# Patient Record
Sex: MALE | Race: BLACK OR AFRICAN AMERICAN | NOT HISPANIC OR LATINO | Employment: FULL TIME | URBAN - METROPOLITAN AREA
[De-identification: names, ages, dates, MRNs, and addresses within clinical notes are randomized per-mention and may not be internally consistent; named-entity substitution may affect disease eponyms.]

---

## 2017-01-18 ENCOUNTER — OFFICE VISIT (OUTPATIENT)
Dept: FAMILY MEDICINE | Facility: CLINIC | Age: 57
End: 2017-01-18
Payer: COMMERCIAL

## 2017-01-18 VITALS
WEIGHT: 163.38 LBS | DIASTOLIC BLOOD PRESSURE: 82 MMHG | TEMPERATURE: 98 F | BODY MASS INDEX: 27.22 KG/M2 | HEART RATE: 76 BPM | HEIGHT: 65 IN | SYSTOLIC BLOOD PRESSURE: 120 MMHG

## 2017-01-18 DIAGNOSIS — M54.2 NECK PAIN: Primary | ICD-10-CM

## 2017-01-18 PROCEDURE — 99999 PR PBB SHADOW E&M-EST. PATIENT-LVL III: CPT | Mod: PBBFAC,,, | Performed by: FAMILY MEDICINE

## 2017-01-18 PROCEDURE — 1159F MED LIST DOCD IN RCRD: CPT | Mod: S$GLB,,, | Performed by: FAMILY MEDICINE

## 2017-01-18 PROCEDURE — 99214 OFFICE O/P EST MOD 30 MIN: CPT | Mod: S$GLB,,, | Performed by: FAMILY MEDICINE

## 2017-01-18 RX ORDER — CHLORZOXAZONE 500 MG/1
500 TABLET ORAL 3 TIMES DAILY PRN
Qty: 40 TABLET | Refills: 3 | Status: SHIPPED | OUTPATIENT
Start: 2017-01-18 | End: 2017-09-20

## 2017-01-18 RX ORDER — NABUMETONE 750 MG/1
750 TABLET, FILM COATED ORAL 2 TIMES DAILY
Qty: 30 TABLET | Refills: 3 | Status: SHIPPED | OUTPATIENT
Start: 2017-01-18 | End: 2017-09-20 | Stop reason: ALTCHOICE

## 2017-01-18 RX ORDER — CHLORZOXAZONE 500 MG/1
500 TABLET ORAL 4 TIMES DAILY PRN
COMMUNITY
End: 2017-01-18 | Stop reason: SDUPTHER

## 2017-01-18 NOTE — MR AVS SNAPSHOT
[facility]  [address] [facility], [address]  [facility] [address]  Phone: [phone]  Fax: [phone]                  Jam Friedman   2017 3:40 PM   Office Visit    Description:  Male : 1960   Provider:  Alejandro Squires MD   Department:  [facility]           Reason for Visit     Neck Pain     Ear Fullness           Diagnoses this Visit        Comments    Neck pain    -  Primary            To Do List           Goals (5 Years of Data)     None       These Medications        Disp Refills Start End    nabumetone (RELAFEN) 750 MG tablet 30 tablet 3 2017     Take 1 tablet (750 mg total) by mouth 2 (two) times daily. - Oral    Pharmacy: [facility] [address] [place] [address] [address] [employer] Ph #: [phone]       chlorzoxazone (PARAFON FORTE DSC) 500 mg Tab 40 tablet 3 2017     Take 1 tablet (500 mg total) by mouth 3 (three) times daily as needed. - Oral    Pharmacy: [facility] [address] [place] [address] [address] [employer] Ph #: [phone]         [facility] On Call     [facility] Care [facility] -  Assistance  Registered nurses in the [facility] provide clinical advisement, health education, appointment booking, and other advisory services.  Call for this free service at [phone].             Medications           Message regarding Medications     Verify the changes and/or additions to your medication regime listed below are the same as discussed with your clinician today.  If any of these changes or additions are incorrect, please notify your healthcare provider.        START taking these NEW medications        Refills    nabumetone (RELAFEN) 750 MG tablet 3    Sig: Take 1 tablet (750 mg total) by mouth 2 (two) times daily.    Class: Normal    Route: Oral    chlorzoxazone (PARAFON FORTE DSC) 500 mg Tab 3    Sig: Take 1 tablet (500 mg total) by mouth 3 (three) times daily as needed.    Class: Normal    Route:  "Oral           Verify that the below list of medications is an accurate representation of the medications you are currently taking.  If none reported, the list may be blank. If incorrect, please contact your healthcare provider. Carry this list with you in case of emergency.           Current Medications     chlorzoxazone (PARAFON FORTE DSC) 500 mg Tab Take 1 tablet (500 mg total) by mouth 3 (three) times daily as needed.    fexofenadine (ALLEGRA) 180 MG tablet Take 1 tablet (180 mg total) by mouth once daily.    nabumetone (RELAFEN) 750 MG tablet Take 1 tablet (750 mg total) by mouth 2 (two) times daily.           Clinical Reference Information           Vital Signs - Last Recorded  Most recent update: 1/18/2017  3:42 PM by Mandy Figueroa MA    BP Pulse Temp Ht Wt BMI    120/82 (BP Location: Left arm, Patient Position: Sitting, BP Method: Manual) 76 98.1 °F (36.7 °C) (Oral) 5' 4.5" (1.638 m) 74.1 kg (163 lb 5.8 oz) 27.61 kg/m2      Blood Pressure          Most Recent Value    BP  120/82      Allergies as of 1/18/2017     Benadryl [Diphenhydramine Hcl]    Milk Containing Products      Immunizations Administered on Date of Encounter - 1/18/2017     None      Orders Placed During Today's Visit      Normal Orders This Visit    Ambulatory consult to [facility]       "

## 2017-01-18 NOTE — PROGRESS NOTES
Subjective:       Patient ID: Jam Friedman is a 56 y.o. male.    Chief Complaint: Neck Pain and Ear Fullness (Right ear)    HPI Comments: Disclaimer: This note has been generated using voice-recognition software. There may be typographical errors that have been missed during proof-reading    Patient is 56-year-old presents today for evaluation recurrent left-sided neck discomfort.  He has a prior history of significant DJD, and is status post cervical surgery.  He has noted the pain to be approximately a 4/10, and worse after waking up in the morning.  It seems to be localized to the left lower neck area, without radiation to the arms or lower back.  No associated numbness or weakness.  He has been taken Parafon forte with minimal relief.  Last MRI, done over 2 years ago showed: Overall stable appearance of the cervical spine in this patient post anterior cervical fixation with severe narrowing of the left neural foramen and moderate narrowing of the right neural foramen and central canal at C5/C6.         Neck Pain    Pertinent negatives include no numbness or weakness.   Ear Fullness    Associated symptoms include neck pain.     Review of Systems   Musculoskeletal: Positive for neck pain and neck stiffness.   Neurological: Negative for weakness and numbness.       Objective:      Physical Exam   Constitutional: He appears well-developed and well-nourished. No distress.   Neck:   Neck exam shows full range of motion with slightly restricted flexion and extension.  He has tenderness to palpation over lateral cervical spine on the left at approximately  C5-C6.       Assessment:       1. Neck pain        Plan:       1.  Continue Parafon Forte  2.  Relafen 750mg bid   3.  Consult Healthy Back program

## 2017-09-20 ENCOUNTER — OFFICE VISIT (OUTPATIENT)
Dept: FAMILY MEDICINE | Facility: CLINIC | Age: 57
End: 2017-09-20
Payer: COMMERCIAL

## 2017-09-20 ENCOUNTER — TELEPHONE (OUTPATIENT)
Dept: FAMILY MEDICINE | Facility: CLINIC | Age: 57
End: 2017-09-20

## 2017-09-20 VITALS
TEMPERATURE: 99 F | WEIGHT: 160.94 LBS | SYSTOLIC BLOOD PRESSURE: 120 MMHG | HEIGHT: 65 IN | BODY MASS INDEX: 26.81 KG/M2 | HEART RATE: 108 BPM | DIASTOLIC BLOOD PRESSURE: 80 MMHG

## 2017-09-20 DIAGNOSIS — R50.9 FEVER, UNSPECIFIED FEVER CAUSE: ICD-10-CM

## 2017-09-20 DIAGNOSIS — R05.9 COUGH: ICD-10-CM

## 2017-09-20 DIAGNOSIS — R06.2 WHEEZING: Primary | ICD-10-CM

## 2017-09-20 LAB
FLUAV AG SPEC QL IA: NEGATIVE
FLUBV AG SPEC QL IA: NEGATIVE
SPECIMEN SOURCE: NORMAL

## 2017-09-20 PROCEDURE — 87400 INFLUENZA A/B EACH AG IA: CPT | Mod: 59,PO

## 2017-09-20 PROCEDURE — 99999 PR PBB SHADOW E&M-EST. PATIENT-LVL III: CPT | Mod: PBBFAC,,, | Performed by: FAMILY MEDICINE

## 2017-09-20 PROCEDURE — 94640 AIRWAY INHALATION TREATMENT: CPT | Mod: S$GLB,,, | Performed by: FAMILY MEDICINE

## 2017-09-20 PROCEDURE — 99214 OFFICE O/P EST MOD 30 MIN: CPT | Mod: 25,S$GLB,, | Performed by: FAMILY MEDICINE

## 2017-09-20 PROCEDURE — 3008F BODY MASS INDEX DOCD: CPT | Mod: S$GLB,,, | Performed by: FAMILY MEDICINE

## 2017-09-20 RX ORDER — METHYLPREDNISOLONE 4 MG/1
TABLET ORAL
Qty: 1 PACKAGE | Refills: 0 | Status: SHIPPED | OUTPATIENT
Start: 2017-09-20 | End: 2017-10-11

## 2017-09-20 RX ORDER — ACETAMINOPHEN AND CODEINE PHOSPHATE 300; 30 MG/1; MG/1
1 TABLET ORAL 3 TIMES DAILY PRN
Qty: 21 TABLET | Refills: 0 | Status: SHIPPED | OUTPATIENT
Start: 2017-09-20 | End: 2017-09-27

## 2017-09-20 RX ORDER — BENZONATATE 200 MG/1
200 CAPSULE ORAL 3 TIMES DAILY PRN
Qty: 30 CAPSULE | Refills: 0 | Status: SHIPPED | OUTPATIENT
Start: 2017-09-20 | End: 2017-09-30

## 2017-09-20 RX ORDER — IPRATROPIUM BROMIDE AND ALBUTEROL SULFATE 2.5; .5 MG/3ML; MG/3ML
3 SOLUTION RESPIRATORY (INHALATION)
Status: COMPLETED | OUTPATIENT
Start: 2017-09-20 | End: 2017-09-20

## 2017-09-20 RX ADMIN — IPRATROPIUM BROMIDE AND ALBUTEROL SULFATE 3 ML: 2.5; .5 SOLUTION RESPIRATORY (INHALATION) at 08:09

## 2017-09-20 NOTE — TELEPHONE ENCOUNTER
----- Message from Dasia Raza sent at 9/20/2017 12:57 PM CDT -----  Contact: call pt at [phone]  Patient would like to get test results.  Name of test (lab, mammo, etc.):  Flu swab  Date of test:  Today 09/20/17  Ordering provider: marcia  Where was the test performed:  [facility]  Comments:

## 2017-09-20 NOTE — PROGRESS NOTES
"Pt complaining of productive cough for 3 days clear sputum  Low grade fever. Chills. No sore throat, no nausea, no vomitting, no diarrhea  Past Medical History:   Diagnosis Date    Spondylosis of cervical joint      Patient  reports that he has never smoked. He has never used smokeless tobacco. He reports that he does not drink alcohol or use drugs.  Family History   Problem Relation Age of Onset    Hypertension Mother     Heart attack Father 60     Had CABG in his 80's       PE:  General:congested  VS :  BP  120/80      BP Location  Left arm     Patient Position  Sitting     BP Method  Small (Manual)     Pulse  108     Temp  98.8 F (37.1 C)     Weight   73 kg (160 lb 15 oz)     Height  5' 4.75" (1.645 m)     Pain Score    8     Other Vitals   BMI 26.99 kg/m2   BSA 1.83 m2   Tobacco        Heent: tm's clear bilaterally, ear canal clear bilaterally. nose clear , clear nasal discharge  Sinuses non-tender to percussion throughout.PERRLA. EOEMI.  Neck:Supple,nt, no thyromegaly,no carotid bruits.  CV: rrr without murmur.  Lungs:coorse bs bilaterally,with expiratory wheezes . fair respiratory excursion  ABD:normal bs, non-tender, no hepatosplenomegaly  Skin:good turgor no rashes or lesions  IMP: Bronchitis           Cough  Plan:flu swab performed and pending.   albuterol-ipratropium 2.5mg-0.5mg/3mL nebulizer solution 3 mL (Completed) 3 mL, Clinic/HOD 1 time   See med card dated 9-20-17   methylPREDNISolone (MEDROL DOSEPACK) 4 mg tablet         benzonatate (TESSALON) 200 MG capsule 200 mg, 3 times daily PRN        Summary: Take 1 capsule (200 mg total) by mouth 3 (three) times daily as needed., Starting Wed 9/20/2017, Until Sat 9/30/2017, Normal   Dose, Route, Frequency: 200 mg, Oral, 3 times daily PRN  Start: 9/20/2017  End: 9/30/2017  Ord/Sold: 9/20/2017 (O)  Pharmacy: [facility] [address] [address] [address]  Report  Long-term:   Med Dose History  ChangeReorderDiscontinue       Patient Sig: Take 1 " capsule (200 mg total) by mouth 3 (three) times daily as needed.       albuterol-ipratropium 2.5mg-0.5mg/3mL nebulizer solution 3 mL (Completed) 3 mL, Clinic/HOD 1 time        acetaminophen-codeine 300-30mg (TYLENOL #3) 300-30 mg Tab 1 tablet, 3 times daily PRN       Call with result of flu swab when available  Return if not better in a few days.

## 2019-01-04 DIAGNOSIS — Z12.11 COLON CANCER SCREENING: ICD-10-CM

## 2022-10-31 ENCOUNTER — OFFICE VISIT (OUTPATIENT)
Dept: PRIMARY CARE CLINIC | Facility: CLINIC | Age: 62
End: 2022-10-31
Payer: COMMERCIAL

## 2022-10-31 VITALS
HEIGHT: 65 IN | DIASTOLIC BLOOD PRESSURE: 60 MMHG | TEMPERATURE: 98 F | OXYGEN SATURATION: 99 % | RESPIRATION RATE: 18 BRPM | BODY MASS INDEX: 26.78 KG/M2 | SYSTOLIC BLOOD PRESSURE: 110 MMHG | HEART RATE: 68 BPM

## 2022-10-31 DIAGNOSIS — N40.0 BENIGN PROSTATIC HYPERPLASIA, UNSPECIFIED WHETHER LOWER URINARY TRACT SYMPTOMS PRESENT: ICD-10-CM

## 2022-10-31 DIAGNOSIS — Z00.00 ANNUAL PHYSICAL EXAM: Primary | ICD-10-CM

## 2022-10-31 PROCEDURE — 1160F PR REVIEW ALL MEDS BY PRESCRIBER/CLIN PHARMACIST DOCUMENTED: ICD-10-PCS | Mod: CPTII,S$GLB,, | Performed by: FAMILY MEDICINE

## 2022-10-31 PROCEDURE — 1159F PR MEDICATION LIST DOCUMENTED IN MEDICAL RECORD: ICD-10-PCS | Mod: CPTII,S$GLB,, | Performed by: FAMILY MEDICINE

## 2022-10-31 PROCEDURE — 99203 OFFICE O/P NEW LOW 30 MIN: CPT | Mod: S$GLB,,, | Performed by: FAMILY MEDICINE

## 2022-10-31 PROCEDURE — 3074F SYST BP LT 130 MM HG: CPT | Mod: CPTII,S$GLB,, | Performed by: FAMILY MEDICINE

## 2022-10-31 PROCEDURE — 3078F PR MOST RECENT DIASTOLIC BLOOD PRESSURE < 80 MM HG: ICD-10-PCS | Mod: CPTII,S$GLB,, | Performed by: FAMILY MEDICINE

## 2022-10-31 PROCEDURE — 99999 PR PBB SHADOW E&M-NEW PATIENT-LVL IV: ICD-10-PCS | Mod: PBBFAC,,, | Performed by: FAMILY MEDICINE

## 2022-10-31 PROCEDURE — 99203 PR OFFICE/OUTPT VISIT, NEW, LEVL III, 30-44 MIN: ICD-10-PCS | Mod: S$GLB,,, | Performed by: FAMILY MEDICINE

## 2022-10-31 PROCEDURE — 1160F RVW MEDS BY RX/DR IN RCRD: CPT | Mod: CPTII,S$GLB,, | Performed by: FAMILY MEDICINE

## 2022-10-31 PROCEDURE — 3078F DIAST BP <80 MM HG: CPT | Mod: CPTII,S$GLB,, | Performed by: FAMILY MEDICINE

## 2022-10-31 PROCEDURE — 3074F PR MOST RECENT SYSTOLIC BLOOD PRESSURE < 130 MM HG: ICD-10-PCS | Mod: CPTII,S$GLB,, | Performed by: FAMILY MEDICINE

## 2022-10-31 PROCEDURE — 1159F MED LIST DOCD IN RCRD: CPT | Mod: CPTII,S$GLB,, | Performed by: FAMILY MEDICINE

## 2022-10-31 PROCEDURE — 99999 PR PBB SHADOW E&M-NEW PATIENT-LVL IV: CPT | Mod: PBBFAC,,, | Performed by: FAMILY MEDICINE

## 2022-10-31 RX ORDER — TAMSULOSIN HYDROCHLORIDE 0.4 MG/1
1 CAPSULE ORAL DAILY
COMMUNITY
Start: 2022-10-05 | End: 2022-11-10 | Stop reason: SDUPTHER

## 2022-10-31 RX ORDER — AZELASTINE 1 MG/ML
SPRAY, METERED NASAL 2 TIMES DAILY
COMMUNITY
Start: 2022-06-21 | End: 2022-11-10 | Stop reason: SDUPTHER

## 2022-10-31 RX ORDER — TAMSULOSIN HYDROCHLORIDE 0.4 MG/1
CAPSULE ORAL
COMMUNITY
Start: 2022-09-01 | End: 2022-10-31

## 2022-10-31 RX ORDER — OMEPRAZOLE 20 MG/1
TABLET, ORALLY DISINTEGRATING, DELAYED RELEASE ORAL
COMMUNITY
Start: 2022-04-25 | End: 2023-03-15

## 2022-10-31 RX ORDER — CETIRIZINE HYDROCHLORIDE 10 MG/1
10 TABLET ORAL DAILY
COMMUNITY
Start: 2022-06-24

## 2022-10-31 RX ORDER — TADALAFIL 20 MG/1
20 TABLET ORAL
COMMUNITY
Start: 2022-10-29 | End: 2023-03-07 | Stop reason: SDUPTHER

## 2022-10-31 NOTE — PROGRESS NOTES
Clinic Note  10/31/2022      Subjective:       Patient ID:  Jam is a 62 y.o. male being seen for an new visit.      Chief Complaint: Establish Care    Establish care-patient with a history of lactose intolerance, BPH and elevated PSA, GI ulcer?  Here to establish care.   patient has new insurance so currently switching to [facility] for care.  Patient works for [employer]    Right cataract-patient getting right cataract surgery in December 2022, has clearance forms with him    GI discomfort? -had an EGD and colonoscopy in 2021 which was unremarkable except a gastric ulcer?  Currently taking PPI, will try to wean off of it    BPH-taking Flomax, Cialis p.r.n..  Needs referral for new urologist        Family History   Problem Relation Age of Onset    Hypertension Mother     Heart attack Father 60        Had CABG in his 80's     Social History     Socioeconomic History    Marital status: Single    Number of children: 3   Occupational History    Occupation: [email or web address]     Employer: [employer]   Tobacco Use    Smoking status: Never    Smokeless tobacco: Never   Substance and Sexual Activity    Alcohol use: No     Alcohol/week: 0.0 standard drinks     Comment: Rare use    Drug use: No    Sexual activity: Yes     Partners: Female     Past Surgical History:   Procedure Laterality Date    CERVICAL FUSION  2005    Anterior and posterior    KNEE ARTHROSCOPY       Medication List with Changes/Refills   Current Medications    AZELASTINE (ASTELIN) 137 MCG (0.1 %) NASAL SPRAY    2 (two) times daily.    CETIRIZINE (ZYRTEC) 10 MG TABLET    Take 10 mg by mouth once daily.    OMEPRAZOLE 20 MG TBLD        TADALAFIL (CIALIS) 20 MG TAB    Take 20 mg by mouth as needed.    TAMSULOSIN (FLOMAX) 0.4 MG CAP    Take 1 capsule by mouth once daily.   Discontinued Medications    TAMSULOSIN (FLOMAX) 0.4 MG CAP         Patient Active Problem List   Diagnosis    Chest pain    Non-cardiac chest pain    Paresthesias in left  "hand    Elevated PSA    Cervicalgia    Ankle sprain and strain    Right ankle pain    BPH (benign prostatic hyperplasia)     Review of Systems   Constitutional:  Negative for chills, fever, malaise/fatigue and weight loss.   HENT:  Negative for congestion, sinus pain and sore throat.    Respiratory:  Negative for cough, shortness of breath and wheezing.    Cardiovascular:  Negative for chest pain and palpitations.   Gastrointestinal:  Negative for constipation, diarrhea, nausea and vomiting.   Genitourinary:  Negative for dysuria, frequency and urgency.   Musculoskeletal:  Negative for myalgias.   Skin:  Negative for rash.   Neurological:  Negative for headaches.       Objective:      /60 (BP Location: Right arm, Patient Position: Sitting, BP Method: Medium (Manual))   Pulse 68   Temp 98.3 °F (36.8 °C) (Oral)   Resp 18   Ht 5' 5" (1.651 m)   SpO2 99%   BMI 26.78 kg/m²   Estimated body mass index is 26.78 kg/m² as calculated from the following:    Height as of this encounter: 5' 5" (1.651 m).    Weight as of 9/20/17: 73 kg (160 lb 15 oz).  Physical Exam  Vitals reviewed.   Constitutional:       General: He is not in acute distress.     Appearance: He is not diaphoretic.   HENT:      Head: Normocephalic and atraumatic.   Eyes:      Conjunctiva/sclera: Conjunctivae normal.   Cardiovascular:      Rate and Rhythm: Normal rate and regular rhythm.      Heart sounds: Normal heart sounds.   Pulmonary:      Effort: Pulmonary effort is normal. No respiratory distress.      Breath sounds: Normal breath sounds. No wheezing.   Abdominal:      General: Bowel sounds are normal.      Palpations: Abdomen is soft.   Musculoskeletal:         General: Normal range of motion.      Cervical back: Normal range of motion.   Skin:     General: Skin is warm and dry.      Findings: No erythema or rash.   Neurological:      Mental Status: He is alert and oriented to person, place, and time.   Psychiatric:         Mood and Affect: " Mood and affect normal.         Behavior: Behavior normal.         Thought Content: Thought content normal.         Judgment: Judgment normal.         Assessment and Plan:     1. Annual physical exam  - patient reports recently having labs done which were all unremarkable, vital signs unremarkable.  Cataract surgery clearance form signed today.  Up-to-date on colon cancer screening, will obtain records    2. Benign prostatic hyperplasia, unspecified whether lower urinary tract symptoms present  - Ambulatory referral/consult to Urology; Future        Follow up:   No follow-ups on file.     Other Orders Placed This Visit:  Orders Placed This Encounter   Procedures    Ambulatory referral/consult to Urology     Standing Status:   Future     Standing Expiration Date:   11/30/2023     Referral Priority:   Routine     Referral Type:   Consultation     Referral Reason:   Specialty Services Required     Requested Specialty:   Urology     Number of Visits Requested:   1           Andreas Ortega MD        This note is dictated on M*Modal word recognition program.  There are word recognition mistakes that are occasionally missed on review.

## 2022-11-04 ENCOUNTER — PATIENT MESSAGE (OUTPATIENT)
Dept: ADMINISTRATIVE | Facility: HOSPITAL | Age: 62
End: 2022-11-04
Payer: COMMERCIAL

## 2022-11-10 ENCOUNTER — PATIENT MESSAGE (OUTPATIENT)
Dept: PRIMARY CARE CLINIC | Facility: CLINIC | Age: 62
End: 2022-11-10
Payer: COMMERCIAL

## 2022-11-10 RX ORDER — TAMSULOSIN HYDROCHLORIDE 0.4 MG/1
1 CAPSULE ORAL DAILY
Qty: 90 CAPSULE | Refills: 3 | Status: SHIPPED | OUTPATIENT
Start: 2022-11-10 | End: 2023-09-13 | Stop reason: SDUPTHER

## 2022-11-10 RX ORDER — AZELASTINE 1 MG/ML
1 SPRAY, METERED NASAL 2 TIMES DAILY
Qty: 30 ML | Refills: 3 | Status: SHIPPED | OUTPATIENT
Start: 2022-11-10 | End: 2023-07-06

## 2022-12-08 DIAGNOSIS — Z12.11 COLON CANCER SCREENING: ICD-10-CM

## 2022-12-15 ENCOUNTER — TELEPHONE (OUTPATIENT)
Dept: ENDOSCOPY | Facility: HOSPITAL | Age: 62
End: 2022-12-15
Payer: COMMERCIAL

## 2022-12-15 NOTE — TELEPHONE ENCOUNTER
----- Message from Argenis Jefferson MA sent at 12/15/2022 10:58 AM CST -----  Type:  Sooner Apoointment Request    Caller is requesting a sooner appointment.    Name of Caller: pt significant other Nay Singer   When is the first available appointment? N/a  Symptoms: complications after colonoscopy   Would the patient rather a call back or a response via [facility]?  Call back  Best Call Back Number: [phone]  Additional Information:  pt needs to be scheduled with this dept and no availability was available to schedule

## 2023-01-24 ENCOUNTER — TELEPHONE (OUTPATIENT)
Dept: PRIMARY CARE CLINIC | Facility: CLINIC | Age: 63
End: 2023-01-24
Payer: COMMERCIAL

## 2023-01-24 DIAGNOSIS — R12 HEARTBURN: Primary | ICD-10-CM

## 2023-02-10 ENCOUNTER — PATIENT MESSAGE (OUTPATIENT)
Dept: GASTROENTEROLOGY | Facility: CLINIC | Age: 63
End: 2023-02-10
Payer: COMMERCIAL

## 2023-02-15 ENCOUNTER — PATIENT MESSAGE (OUTPATIENT)
Dept: GASTROENTEROLOGY | Facility: CLINIC | Age: 63
End: 2023-02-15
Payer: COMMERCIAL

## 2023-02-28 ENCOUNTER — TELEPHONE (OUTPATIENT)
Dept: ENDOSCOPY | Facility: HOSPITAL | Age: 63
End: 2023-02-28
Payer: COMMERCIAL

## 2023-02-28 ENCOUNTER — PATIENT MESSAGE (OUTPATIENT)
Dept: PRIMARY CARE CLINIC | Facility: CLINIC | Age: 63
End: 2023-02-28
Payer: COMMERCIAL

## 2023-02-28 NOTE — TELEPHONE ENCOUNTER
----- Message from Jazmín Chu sent at 2/28/2023  2:02 PM CST -----  Regarding: Appt  Contact: [phone]  Pt requesting appt from a referral for the following Heartburn [R12].... Please call and adv @[phone]

## 2023-03-01 ENCOUNTER — PATIENT MESSAGE (OUTPATIENT)
Dept: PRIMARY CARE CLINIC | Facility: CLINIC | Age: 63
End: 2023-03-01
Payer: COMMERCIAL

## 2023-03-03 RX ORDER — TADALAFIL 20 MG/1
20 TABLET ORAL
OUTPATIENT
Start: 2023-03-03

## 2023-03-07 ENCOUNTER — LAB VISIT (OUTPATIENT)
Dept: LAB | Facility: HOSPITAL | Age: 63
End: 2023-03-07
Attending: UROLOGY
Payer: COMMERCIAL

## 2023-03-07 ENCOUNTER — OFFICE VISIT (OUTPATIENT)
Dept: UROLOGY | Facility: CLINIC | Age: 63
End: 2023-03-07
Payer: COMMERCIAL

## 2023-03-07 VITALS
HEART RATE: 84 BPM | SYSTOLIC BLOOD PRESSURE: 127 MMHG | WEIGHT: 165 LBS | BODY MASS INDEX: 27.49 KG/M2 | DIASTOLIC BLOOD PRESSURE: 78 MMHG | HEIGHT: 65 IN

## 2023-03-07 DIAGNOSIS — R97.20 ELEVATED PSA: ICD-10-CM

## 2023-03-07 DIAGNOSIS — N52.01 ERECTILE DYSFUNCTION DUE TO ARTERIAL INSUFFICIENCY: ICD-10-CM

## 2023-03-07 DIAGNOSIS — R97.20 ELEVATED PSA: Primary | ICD-10-CM

## 2023-03-07 DIAGNOSIS — N40.0 BENIGN PROSTATIC HYPERPLASIA WITHOUT LOWER URINARY TRACT SYMPTOMS: ICD-10-CM

## 2023-03-07 LAB — COMPLEXED PSA SERPL-MCNC: 17.9 NG/ML (ref 0–4)

## 2023-03-07 PROCEDURE — 1159F MED LIST DOCD IN RCRD: CPT | Mod: CPTII,S$GLB,, | Performed by: UROLOGY

## 2023-03-07 PROCEDURE — 3008F BODY MASS INDEX DOCD: CPT | Mod: CPTII,S$GLB,, | Performed by: UROLOGY

## 2023-03-07 PROCEDURE — 3008F PR BODY MASS INDEX (BMI) DOCUMENTED: ICD-10-PCS | Mod: CPTII,S$GLB,, | Performed by: UROLOGY

## 2023-03-07 PROCEDURE — 3074F PR MOST RECENT SYSTOLIC BLOOD PRESSURE < 130 MM HG: ICD-10-PCS | Mod: CPTII,S$GLB,, | Performed by: UROLOGY

## 2023-03-07 PROCEDURE — 3074F SYST BP LT 130 MM HG: CPT | Mod: CPTII,S$GLB,, | Performed by: UROLOGY

## 2023-03-07 PROCEDURE — 84153 ASSAY OF PSA TOTAL: CPT | Performed by: UROLOGY

## 2023-03-07 PROCEDURE — 99999 PR PBB SHADOW E&M-EST. PATIENT-LVL III: ICD-10-PCS | Mod: PBBFAC,,, | Performed by: UROLOGY

## 2023-03-07 PROCEDURE — 36415 COLL VENOUS BLD VENIPUNCTURE: CPT | Performed by: UROLOGY

## 2023-03-07 PROCEDURE — 1160F PR REVIEW ALL MEDS BY PRESCRIBER/CLIN PHARMACIST DOCUMENTED: ICD-10-PCS | Mod: CPTII,S$GLB,, | Performed by: UROLOGY

## 2023-03-07 PROCEDURE — 3078F DIAST BP <80 MM HG: CPT | Mod: CPTII,S$GLB,, | Performed by: UROLOGY

## 2023-03-07 PROCEDURE — 99204 PR OFFICE/OUTPT VISIT, NEW, LEVL IV, 45-59 MIN: ICD-10-PCS | Mod: S$GLB,,, | Performed by: UROLOGY

## 2023-03-07 PROCEDURE — 3078F PR MOST RECENT DIASTOLIC BLOOD PRESSURE < 80 MM HG: ICD-10-PCS | Mod: CPTII,S$GLB,, | Performed by: UROLOGY

## 2023-03-07 PROCEDURE — 1159F PR MEDICATION LIST DOCUMENTED IN MEDICAL RECORD: ICD-10-PCS | Mod: CPTII,S$GLB,, | Performed by: UROLOGY

## 2023-03-07 PROCEDURE — 99999 PR PBB SHADOW E&M-EST. PATIENT-LVL III: CPT | Mod: PBBFAC,,, | Performed by: UROLOGY

## 2023-03-07 PROCEDURE — 1160F RVW MEDS BY RX/DR IN RCRD: CPT | Mod: CPTII,S$GLB,, | Performed by: UROLOGY

## 2023-03-07 PROCEDURE — 99204 OFFICE O/P NEW MOD 45 MIN: CPT | Mod: S$GLB,,, | Performed by: UROLOGY

## 2023-03-07 RX ORDER — TADALAFIL 20 MG/1
20 TABLET ORAL
Qty: 30 TABLET | Refills: 11 | Status: SHIPPED | OUTPATIENT
Start: 2023-03-07 | End: 2024-03-05

## 2023-03-07 NOTE — PROGRESS NOTES
Subjective:       Patient ID: Jam Friedman is a 62 y.o. male.    Chief Complaint:  elevated psa    History of Present Illness  HPI  Patient is a 62 y.o. male who is new to our clinic and referred by their PCP, Dr. Ortega for evaluation of ED.   He is on flomax for BPH, but his symptoms are minimal.  He also takes cialis for ED---good success.  Gets occasional spontaneous erections.     Has a h/o elevated psa---has not been seen at [facility] since  at which point he had 3 PSA levels elevated.  However, he reports having had psa's done with Dr. Gastelum that were normal (not available for review).   Never had a biopsy.  Never had a prostate mri.      +fh of prostate cancer, father  at 87 yoa from prostate cancer.  Details unknown.       IPSS Questionnaire (AUA-SS) 3/7/23:  Over the past month    1)  Incomplete Emptying - How often have you had a sensation of not emptying your bladder completely after you finish urinating?  0 - Not at all   2)  Frequency - How often have you had to urinate again less than two hours after you finished urinating? 0 - Not at all   3)  Intermittency - How often have you found you stopped and started again several times when you urinated?  0 - Not at all   4) Urgency - How difficult have you found it to postpone urination?  0 - Not at all   5) Weak Stream - How often have you had a weak urinary stream?  0 - Not at all   6) Straining  - How often have you had to push or strain to begin urination?  0 - Not at all   7) Nocturia - How many times did you most typically get up to urinate from the time you went to bed until the time you got up in the morning?  0 - None   Total score:  0-7 mild, 8-19 moderate, 20-35 severe 0   Quality of Life:  1 - Pleased          Lab Results   Component Value Date    PSA 8.6 (H) 2015    PSA 7.5 (H) 2014    PSADIAG 7.7 (H) 2014         Review of Systems  Review of Systems  All other systems reviewed and negative except pertinent positives  noted in HPI.       Objective:     Physical Exam  Vitals and nursing note reviewed.   Constitutional:       Appearance: Normal appearance. He is well-developed.   HENT:      Head: Normocephalic.   Neck:      Trachea: No tracheal deviation.   Cardiovascular:      Rate and Rhythm: Normal rate.   Pulmonary:      Effort: Pulmonary effort is normal. No tachypnea, accessory muscle usage or respiratory distress.   Abdominal:      General: There is no distension.      Palpations: Abdomen is soft. There is no fluid wave or mass.      Tenderness: There is no abdominal tenderness. There is no rebound.      Hernia: No hernia is present. There is no hernia in the right inguinal area or left inguinal area.      Comments: Liver/spleen non-palpable.   Genitourinary:     Penis: Normal and uncircumcised.       Testes: Normal.         Right: Mass or tenderness not present. Right testis is descended.         Left: Mass or tenderness not present. Left testis is descended.      Prostate: Normal. Not tender.      Rectum: No mass, tenderness or external hemorrhoid. Normal anal tone.      Comments: Scrotum showed no rashes or lesions.  Anus/perineum without lesion.  Epididymis showed no masses or tenderness. No meatal stenosis, meatus in normal location. No penile discharge/plaques. Prostate smooth, no nodules, 30 grams.  Seminal vesicles non-palpable.  Normal sphincter tone.       Musculoskeletal:         General: Normal range of motion.   Lymphadenopathy:      Lower Body: No right inguinal adenopathy. No left inguinal adenopathy.   Skin:     Findings: No bruising or rash.   Neurological:      Mental Status: He is alert and oriented to person, place, and time.   Psychiatric:         Speech: Speech normal.         Behavior: Behavior normal. Behavior is cooperative.         Thought Content: Thought content normal.       Lab Review  Lab Results   Component Value Date    COLORU yellow 08/03/2015    SPECGRAV 1.015 08/03/2015    PHUR 7  08/03/2015    WBCUR n 08/03/2015    NITRITE n 08/03/2015    GLUCOSEUR n 08/03/2015    KETONESU n 08/03/2015    UROBILINOGEN + 08/03/2015    RBCUR n 08/03/2015         Assessment:        1. Elevated PSA    2. Benign prostatic hyperplasia without lower urinary tract symptoms    3. Erectile dysfunction due to arterial insufficiency            Plan:     Elevated PSA    Benign prostatic hyperplasia without lower urinary tract symptoms    Erectile dysfunction due to arterial insufficiency      -The patient was counseled on the implications of an elevated PSA and velocity. It was explained in detail that this is a screening test and does not indicate any known presence of prostate cancer but that he is at increased risk given he has an elevated PSA. All his questions were answered by me fully.     -will obtain psa today.     -continue flomax for BPH at patient's request despite his symptoms being minimal.  He would like to remain on medications.   -A post void residual bladder scan was performed immediately after voiding. The patient's PVR was noted to be 10cc.    -cialis for ED---refills sent to pharmacy on file.

## 2023-03-08 ENCOUNTER — TELEPHONE (OUTPATIENT)
Dept: UROLOGY | Facility: CLINIC | Age: 63
End: 2023-03-08
Payer: COMMERCIAL

## 2023-03-08 DIAGNOSIS — R97.20 ELEVATED PSA: Primary | ICD-10-CM

## 2023-03-08 NOTE — TELEPHONE ENCOUNTER
I AKASHP Jam now and he is scheduled per below.  Thank you.   3/30/2023 Status: Homer   Appt Time: 6:00 AM Length: 60   Visit Type: MRI PROSTATE        ----- Message from Theo Clarke MD sent at 3/8/2023  3:29 PM CST -----  Patient needs a prostate mri asap.

## 2023-03-10 ENCOUNTER — TELEPHONE (OUTPATIENT)
Dept: UROLOGY | Facility: CLINIC | Age: 63
End: 2023-03-10
Payer: COMMERCIAL

## 2023-03-10 ENCOUNTER — TELEPHONE (OUTPATIENT)
Dept: UROLOGY | Facility: HOSPITAL | Age: 63
End: 2023-03-10
Payer: COMMERCIAL

## 2023-03-10 RX ORDER — DIAZEPAM 5 MG/1
5 TABLET ORAL ONCE
Qty: 2 TABLET | Refills: 0 | Status: SHIPPED | OUTPATIENT
Start: 2023-03-10 | End: 2023-07-06

## 2023-03-10 NOTE — TELEPHONE ENCOUNTER
I spoke with Linn Diggs now for clarification that pt may take one or two Valium depending on anxiety before MRI Abdomen Bundle at number below.    ----- Message from Kisha Lovelace sent at 3/10/2023  2:53 PM CST -----  Regarding: clarification of rx  Rosa w/[facility] calling regarding Patient Advice (message) for #diazePAM (VALIUM) 5 MG tablet. He needs clarification on which instruction should pt use due to there are two on rx. Plz call      [facility] [place], [place] - [address]  [address] [facility] [address]  Phone: [phone] Fax: [phone]

## 2023-03-10 NOTE — TELEPHONE ENCOUNTER
Pt states he is claustrophobic and request Valium for MRI Prostate on 3/30/23.  Sig Other Katheryn will drive pt.  Thank you.

## 2023-03-10 NOTE — TELEPHONE ENCOUNTER
----- Message from Merlyn Nicholas RN sent at 3/10/2023  1:06 PM CST -----  Regarding: request Valium for MRI Prostate on 3/30/23.  Pt states he is claustrophobic and request Valium for MRI Prostate on 3/30/23.  Sig Other Katheryn will drive pt.  Thank you.

## 2023-03-14 NOTE — PROGRESS NOTES
[facility] Consultation Note    Reason for Consult:  heartburn     PCP:   Andreas Ortega   [address] / [place] [address]    Referring MD:  Andreas Ortega Md  [address] [place] [address]    HPI:  This is a 62 y.o. male here for evaluation of heartburn. PMHx of cervical spondylosis. He has had issues with reflux which has been worsening recently. Started on omeprazole 20mg but this has not helped. He has burning epigastric and chest pain after eating. Food triggers include dairy and spicy foods. Uses Excedrin and BC powder for headaches/neck pain. Recently was told me his sister to stop the BC powder though. Denied N/V, dysphagia, hematemesis, melena, weight loss or early satiety. Most recent CBC was wnl a few years ago. US ab in 2015 was normal. No endoscopy on file. He does have a report from a cscope in 2021 which removed a 27mm sigmoid polyp and noted diverticular disease and hemorrhoids. He will be due for repeat cscope in 8/2024.   No Fh of CRC, gastric cancer, celiac or IBD.  BM are normal, no blood in stool.     ROS:  Constitutional: No fevers, chills, No weight loss  ENT: No allergies  CV: No chest pain  Pulm: No cough, No shortness of breath  Ophtho: No vision changes  GI: see HPI  Derm: No rash  Heme: No lymphadenopathy, No bruising  MSK: No arthritis  : No dysuria, No hematuria  Endo: No hot or cold intolerance  Neuro: No syncope, No seizure  Psych: No anxiety, No depression    Medical History:  has a past medical history of Spondylosis of cervical joint.    Surgical History:  has a past surgical history that includes Cervical fusion (2005) and Knee arthroscopy.    Family History: family history includes Heart attack (age of onset: 60) in his father; Hypertension in his mother..     Social History:  reports that he has never smoked. He has never used smokeless tobacco. He reports that he does not drink alcohol and does not use drugs.    Review of patient's  allergies indicates:   Allergen Reactions    Benadryl [diphenhydramine hcl] Other (See Comments)     Hyperactivity    Milk containing products        Current Outpatient Rx   Medication Sig Dispense Refill    azelastine (ASTELIN) 137 mcg (0.1 %) nasal spray 1 spray (137 mcg total) by Nasal route 2 (two) times daily. 30 mL 3    cetirizine (ZYRTEC) 10 MG tablet Take 10 mg by mouth once daily.      tadalafiL (CIALIS) 20 MG Tab Take 1 tablet (20 mg total) by mouth as needed (ED). 30 tablet 11    tamsulosin (FLOMAX) 0.4 mg Cap Take 1 capsule (0.4 mg total) by mouth once daily. 90 capsule 3    diazePAM (VALIUM) 5 MG tablet Take 1 tablet (5 mg total) by mouth once. Take all 1-2 pills at once 30-45 minutes prior to procedure. for 1 dose 2 tablet 0    omeprazole (PRILOSEC) 40 MG capsule Take 1 capsule (40 mg total) by mouth once daily. 30 capsule 11       Objective Findings:    Vital Signs:  BP (!) 149/89   Pulse 78   Wt 80.4 kg (177 lb 4 oz)   BMI 29.50 kg/m²   Body mass index is 29.5 kg/m².    Physical Exam:  General Appearance: Well appearing in no acute distress  Head:   Normocephalic, without obvious abnormality  Eyes:    No scleral icterus, EOMI  ENT: Neck supple, Lips, mucosa, and tongue normal    Lungs: CTA bilaterally in anterior and posterior fields, no wheezes, no crackles.  Heart:  Regular rate and rhythm, S1, S2 normal, no murmurs heard  Abdomen: Soft, non tender, non distended with positive bowel sounds in all four quadrants. No hepatosplenomegaly, ascites, or mass  Extremities: 2+ pulses, no clubbing, cyanosis or edema  Skin: No rash  Neurologic: no focal deficits       Labs:  Lab Results   Component Value Date    WBC 6.69 08/18/2016    HGB 15.0 08/18/2016    HCT 45.6 08/18/2016     08/18/2016    CHOL 168 08/18/2016    TRIG 64 08/18/2016    HDL 38 (L) 08/18/2016    ALT 14 08/18/2016    AST 24 08/18/2016     08/18/2016    K 4.4 08/18/2016     08/18/2016    CREATININE 1.2 08/18/2016    BUN  17 08/18/2016    CO2 23 08/18/2016    TSH 1.345 08/18/2016    PSA 8.6 (H) 07/02/2015       Imaging:  Reviewed     US      1.  No significant abnormality is noted.    Endoscopy:    None     Assessment:    GERD    NSAID/ASA Use   Hx of colon polyps  Diverticular disease     Patient with recent worsening of reflux symptoms not responding to PPI in the setting of chronic NSAID and ASA use. Plan for EGD with biopsies. Increase PPI to 40mg and discussed how to take correctly. Plan for cscope in 9/2024 for CRC surveillance. Recommended fiber daily for diverticular disease      Recommendations:    - EGD with biopsies   - Take PPI on empty stomach, 30 mins before meals   - H2 blocker for breakthrough symptoms   - GERD precautions discussed   - No NSAIDs or ASA   - Cscope in 9/2024 for CRC surveillance   - CBC today     Follow up for after endoscopy .      Order summary:  Orders Placed This Encounter    CBC Auto Differential    Ambulatory referral/consult to Endo Procedure     omeprazole (PRILOSEC) 40 MG capsule         Thank you so much for allowing me to participate in the care of Jam Giron MD  Gastroenterology   [facility]

## 2023-03-15 ENCOUNTER — LAB VISIT (OUTPATIENT)
Dept: LAB | Facility: HOSPITAL | Age: 63
End: 2023-03-15
Attending: STUDENT IN AN ORGANIZED HEALTH CARE EDUCATION/TRAINING PROGRAM
Payer: COMMERCIAL

## 2023-03-15 ENCOUNTER — TELEPHONE (OUTPATIENT)
Dept: GASTROENTEROLOGY | Facility: CLINIC | Age: 63
End: 2023-03-15

## 2023-03-15 ENCOUNTER — PATIENT MESSAGE (OUTPATIENT)
Dept: ENDOSCOPY | Facility: HOSPITAL | Age: 63
End: 2023-03-15
Payer: COMMERCIAL

## 2023-03-15 ENCOUNTER — OFFICE VISIT (OUTPATIENT)
Dept: GASTROENTEROLOGY | Facility: CLINIC | Age: 63
End: 2023-03-15
Payer: COMMERCIAL

## 2023-03-15 VITALS
BODY MASS INDEX: 29.5 KG/M2 | SYSTOLIC BLOOD PRESSURE: 149 MMHG | WEIGHT: 177.25 LBS | HEART RATE: 78 BPM | DIASTOLIC BLOOD PRESSURE: 89 MMHG

## 2023-03-15 DIAGNOSIS — R12 HEARTBURN: ICD-10-CM

## 2023-03-15 DIAGNOSIS — R12 HEARTBURN: Primary | ICD-10-CM

## 2023-03-15 DIAGNOSIS — Z12.11 COLON CANCER SCREENING: Primary | ICD-10-CM

## 2023-03-15 LAB
BASOPHILS # BLD AUTO: 0.03 K/UL (ref 0–0.2)
BASOPHILS NFR BLD: 0.5 % (ref 0–1.9)
DIFFERENTIAL METHOD: ABNORMAL
EOSINOPHIL # BLD AUTO: 0.1 K/UL (ref 0–0.5)
EOSINOPHIL NFR BLD: 1 % (ref 0–8)
ERYTHROCYTE [DISTWIDTH] IN BLOOD BY AUTOMATED COUNT: 11.9 % (ref 11.5–14.5)
HCT VFR BLD AUTO: 45.9 % (ref 40–54)
HGB BLD-MCNC: 15 G/DL (ref 14–18)
IMM GRANULOCYTES # BLD AUTO: 0.02 K/UL (ref 0–0.04)
IMM GRANULOCYTES NFR BLD AUTO: 0.3 % (ref 0–0.5)
LYMPHOCYTES # BLD AUTO: 1.9 K/UL (ref 1–4.8)
LYMPHOCYTES NFR BLD: 31.1 % (ref 18–48)
MCH RBC QN AUTO: 31.6 PG (ref 27–31)
MCHC RBC AUTO-ENTMCNC: 32.7 G/DL (ref 32–36)
MCV RBC AUTO: 97 FL (ref 82–98)
MONOCYTES # BLD AUTO: 0.6 K/UL (ref 0.3–1)
MONOCYTES NFR BLD: 9.9 % (ref 4–15)
NEUTROPHILS # BLD AUTO: 3.5 K/UL (ref 1.8–7.7)
NEUTROPHILS NFR BLD: 57.2 % (ref 38–73)
NRBC BLD-RTO: 0 /100 WBC
PLATELET # BLD AUTO: 214 K/UL (ref 150–450)
PMV BLD AUTO: 12.1 FL (ref 9.2–12.9)
RBC # BLD AUTO: 4.74 M/UL (ref 4.6–6.2)
WBC # BLD AUTO: 6.04 K/UL (ref 3.9–12.7)

## 2023-03-15 PROCEDURE — 36415 COLL VENOUS BLD VENIPUNCTURE: CPT | Performed by: STUDENT IN AN ORGANIZED HEALTH CARE EDUCATION/TRAINING PROGRAM

## 2023-03-15 PROCEDURE — 99999 PR PBB SHADOW E&M-EST. PATIENT-LVL IV: CPT | Mod: PBBFAC,,, | Performed by: STUDENT IN AN ORGANIZED HEALTH CARE EDUCATION/TRAINING PROGRAM

## 2023-03-15 PROCEDURE — 99999 PR PBB SHADOW E&M-EST. PATIENT-LVL IV: ICD-10-PCS | Mod: PBBFAC,,, | Performed by: STUDENT IN AN ORGANIZED HEALTH CARE EDUCATION/TRAINING PROGRAM

## 2023-03-15 PROCEDURE — 99204 PR OFFICE/OUTPT VISIT, NEW, LEVL IV, 45-59 MIN: ICD-10-PCS | Mod: S$GLB,,, | Performed by: STUDENT IN AN ORGANIZED HEALTH CARE EDUCATION/TRAINING PROGRAM

## 2023-03-15 PROCEDURE — 85025 COMPLETE CBC W/AUTO DIFF WBC: CPT | Performed by: STUDENT IN AN ORGANIZED HEALTH CARE EDUCATION/TRAINING PROGRAM

## 2023-03-15 PROCEDURE — 3077F SYST BP >= 140 MM HG: CPT | Mod: CPTII,S$GLB,, | Performed by: STUDENT IN AN ORGANIZED HEALTH CARE EDUCATION/TRAINING PROGRAM

## 2023-03-15 PROCEDURE — 3079F PR MOST RECENT DIASTOLIC BLOOD PRESSURE 80-89 MM HG: ICD-10-PCS | Mod: CPTII,S$GLB,, | Performed by: STUDENT IN AN ORGANIZED HEALTH CARE EDUCATION/TRAINING PROGRAM

## 2023-03-15 PROCEDURE — 3008F PR BODY MASS INDEX (BMI) DOCUMENTED: ICD-10-PCS | Mod: CPTII,S$GLB,, | Performed by: STUDENT IN AN ORGANIZED HEALTH CARE EDUCATION/TRAINING PROGRAM

## 2023-03-15 PROCEDURE — 1159F MED LIST DOCD IN RCRD: CPT | Mod: CPTII,S$GLB,, | Performed by: STUDENT IN AN ORGANIZED HEALTH CARE EDUCATION/TRAINING PROGRAM

## 2023-03-15 PROCEDURE — 3008F BODY MASS INDEX DOCD: CPT | Mod: CPTII,S$GLB,, | Performed by: STUDENT IN AN ORGANIZED HEALTH CARE EDUCATION/TRAINING PROGRAM

## 2023-03-15 PROCEDURE — 99204 OFFICE O/P NEW MOD 45 MIN: CPT | Mod: S$GLB,,, | Performed by: STUDENT IN AN ORGANIZED HEALTH CARE EDUCATION/TRAINING PROGRAM

## 2023-03-15 PROCEDURE — 3077F PR MOST RECENT SYSTOLIC BLOOD PRESSURE >= 140 MM HG: ICD-10-PCS | Mod: CPTII,S$GLB,, | Performed by: STUDENT IN AN ORGANIZED HEALTH CARE EDUCATION/TRAINING PROGRAM

## 2023-03-15 PROCEDURE — 1159F PR MEDICATION LIST DOCUMENTED IN MEDICAL RECORD: ICD-10-PCS | Mod: CPTII,S$GLB,, | Performed by: STUDENT IN AN ORGANIZED HEALTH CARE EDUCATION/TRAINING PROGRAM

## 2023-03-15 PROCEDURE — 3079F DIAST BP 80-89 MM HG: CPT | Mod: CPTII,S$GLB,, | Performed by: STUDENT IN AN ORGANIZED HEALTH CARE EDUCATION/TRAINING PROGRAM

## 2023-03-15 RX ORDER — OMEPRAZOLE 40 MG/1
40 CAPSULE, DELAYED RELEASE ORAL DAILY
Qty: 30 CAPSULE | Refills: 11 | Status: SHIPPED | OUTPATIENT
Start: 2023-03-15 | End: 2023-08-02 | Stop reason: SDUPTHER

## 2023-03-15 NOTE — PATIENT INSTRUCTIONS
Recommend to take omeprazole on an empty stomach, 30 mins before meals     Avoid eating 3-4 hours before bed     Coffee, caffeine, chocolate, peppermint and alcohol are common reflux triggers     No NSAID use     ______________________________________________________________________________________________________    Start psyllium fiber daily (metamucil)

## 2023-03-27 ENCOUNTER — TELEPHONE (OUTPATIENT)
Dept: ENDOSCOPY | Facility: HOSPITAL | Age: 63
End: 2023-03-27
Payer: COMMERCIAL

## 2023-03-27 ENCOUNTER — ANESTHESIA EVENT (OUTPATIENT)
Dept: ENDOSCOPY | Facility: HOSPITAL | Age: 63
End: 2023-03-27
Payer: COMMERCIAL

## 2023-03-27 NOTE — TELEPHONE ENCOUNTER
----- Message from Jackson Hinton sent at 3/27/2023  4:41 PM CDT -----  Regarding: adv  Contact: [phone]  Pt calling in regards to his procedure darrel hasnt gotten any instruction pls call and [phone]

## 2023-03-27 NOTE — ANESTHESIA PREPROCEDURE EVALUATION
03/27/2023  Jam Friedman is a 62 y.o., male.  Past Medical History:   Diagnosis Date    Spondylosis of cervical joint      Past Surgical History:   Procedure Laterality Date    CERVICAL FUSION  2005    Anterior and posterior    KNEE ARTHROSCOPY         Pre-op Assessment    I have reviewed the Patient Summary Reports.    I have reviewed the NPO Status.   I have reviewed the Medications.     Review of Systems  Anesthesia Hx:  No problems with previous Anesthesia  Neg history of prior surgery. Denies Family Hx of Anesthesia complications.   Denies Personal Hx of Anesthesia complications.   Social:  Non-Smoker, Social Alcohol Use    Hematology/Oncology:  Hematology Normal   Oncology Normal     EENT/Dental:EENT/Dental Normal   Cardiovascular:  Cardiovascular Normal Exercise tolerance: good     Pulmonary:  Pulmonary Normal    Renal/:  Renal/ Normal     Hepatic/GI:  Hepatic/GI Normal    Musculoskeletal:   Arthritis     Neurological:  Neurology Normal    Endocrine:  Endocrine Normal    Dermatological:  Skin Normal    Psych:  Psychiatric Normal           Physical Exam  General: Well nourished, Cooperative, Alert and Oriented    Airway:  Mallampati: II   Mouth Opening: Normal  TM Distance: Normal  Tongue: Normal  Neck ROM: Normal ROM    Dental:  Intact        Anesthesia Plan  Type of Anesthesia, risks & benefits discussed:    Anesthesia Type: Gen Natural Airway  Intra-op Monitoring Plan: Standard ASA Monitors  Post Op Pain Control Plan: multimodal analgesia  Induction:  IV  Informed Consent: Informed consent signed with the Patient and all parties understand the risks and agree with anesthesia plan.  All questions answered.   ASA Score: 2  Day of Surgery Review of History & Physical: H&P Update referred to the surgeon/provider.    Ready For Surgery From Anesthesia Perspective.     .

## 2023-03-28 ENCOUNTER — ANESTHESIA (OUTPATIENT)
Dept: ENDOSCOPY | Facility: HOSPITAL | Age: 63
End: 2023-03-28
Payer: COMMERCIAL

## 2023-03-28 ENCOUNTER — HOSPITAL ENCOUNTER (OUTPATIENT)
Facility: HOSPITAL | Age: 63
Discharge: HOME | End: 2023-03-28
Attending: STUDENT IN AN ORGANIZED HEALTH CARE EDUCATION/TRAINING PROGRAM | Admitting: STUDENT IN AN ORGANIZED HEALTH CARE EDUCATION/TRAINING PROGRAM
Payer: COMMERCIAL

## 2023-03-28 VITALS
DIASTOLIC BLOOD PRESSURE: 87 MMHG | TEMPERATURE: 98 F | SYSTOLIC BLOOD PRESSURE: 131 MMHG | HEART RATE: 60 BPM | RESPIRATION RATE: 16 BRPM | BODY MASS INDEX: 29.53 KG/M2 | HEIGHT: 65 IN | WEIGHT: 177.25 LBS | OXYGEN SATURATION: 100 %

## 2023-03-28 DIAGNOSIS — R12 HEARTBURN: Primary | ICD-10-CM

## 2023-03-28 PROCEDURE — 25000003 PHARM REV CODE 250: Performed by: STUDENT IN AN ORGANIZED HEALTH CARE EDUCATION/TRAINING PROGRAM

## 2023-03-28 PROCEDURE — 37000008 HC ANESTHESIA 1ST 15 MINUTES: Performed by: STUDENT IN AN ORGANIZED HEALTH CARE EDUCATION/TRAINING PROGRAM

## 2023-03-28 PROCEDURE — D9220A PRA ANESTHESIA: ICD-10-PCS | Mod: CRNA,,, | Performed by: STUDENT IN AN ORGANIZED HEALTH CARE EDUCATION/TRAINING PROGRAM

## 2023-03-28 PROCEDURE — D9220A PRA ANESTHESIA: Mod: ANES,,, | Performed by: ANESTHESIOLOGY

## 2023-03-28 PROCEDURE — 88305 TISSUE EXAM BY PATHOLOGIST: CPT | Performed by: PATHOLOGY

## 2023-03-28 PROCEDURE — D9220A PRA ANESTHESIA: Mod: CRNA,,, | Performed by: STUDENT IN AN ORGANIZED HEALTH CARE EDUCATION/TRAINING PROGRAM

## 2023-03-28 PROCEDURE — 88305 TISSUE EXAM BY PATHOLOGIST: CPT | Mod: 26,,, | Performed by: PATHOLOGY

## 2023-03-28 PROCEDURE — 27201274 HC FORCEPS, SPYBITE: Performed by: STUDENT IN AN ORGANIZED HEALTH CARE EDUCATION/TRAINING PROGRAM

## 2023-03-28 PROCEDURE — 88305 TISSUE EXAM BY PATHOLOGIST: ICD-10-PCS | Mod: 26,,, | Performed by: PATHOLOGY

## 2023-03-28 PROCEDURE — D9220A PRA ANESTHESIA: ICD-10-PCS | Mod: ANES,,, | Performed by: ANESTHESIOLOGY

## 2023-03-28 PROCEDURE — 63600175 PHARM REV CODE 636 W HCPCS: Performed by: STUDENT IN AN ORGANIZED HEALTH CARE EDUCATION/TRAINING PROGRAM

## 2023-03-28 PROCEDURE — 43239 EGD BIOPSY SINGLE/MULTIPLE: CPT | Performed by: STUDENT IN AN ORGANIZED HEALTH CARE EDUCATION/TRAINING PROGRAM

## 2023-03-28 PROCEDURE — 43239 EGD BIOPSY SINGLE/MULTIPLE: CPT | Mod: ,,, | Performed by: STUDENT IN AN ORGANIZED HEALTH CARE EDUCATION/TRAINING PROGRAM

## 2023-03-28 PROCEDURE — 43239 PR EGD, FLEX, W/BIOPSY, SGL/MULTI: ICD-10-PCS | Mod: ,,, | Performed by: STUDENT IN AN ORGANIZED HEALTH CARE EDUCATION/TRAINING PROGRAM

## 2023-03-28 RX ORDER — SODIUM CHLORIDE 9 MG/ML
INJECTION, SOLUTION INTRAVENOUS CONTINUOUS
Status: DISCONTINUED | OUTPATIENT
Start: 2023-03-28 | End: 2023-03-28 | Stop reason: HOSPADM

## 2023-03-28 RX ORDER — PROPOFOL 10 MG/ML
VIAL (ML) INTRAVENOUS
Status: DISCONTINUED | OUTPATIENT
Start: 2023-03-28 | End: 2023-03-28

## 2023-03-28 RX ORDER — LIDOCAINE HYDROCHLORIDE 20 MG/ML
INJECTION INTRAVENOUS
Status: DISCONTINUED | OUTPATIENT
Start: 2023-03-28 | End: 2023-03-28

## 2023-03-28 RX ORDER — PROPOFOL 10 MG/ML
VIAL (ML) INTRAVENOUS CONTINUOUS PRN
Status: DISCONTINUED | OUTPATIENT
Start: 2023-03-28 | End: 2023-03-28

## 2023-03-28 RX ADMIN — SODIUM CHLORIDE: 0.9 INJECTION, SOLUTION INTRAVENOUS at 12:03

## 2023-03-28 RX ADMIN — GLYCOPYRROLATE 0.2 MG: 0.2 INJECTION, SOLUTION INTRAMUSCULAR; INTRAVENOUS at 12:03

## 2023-03-28 RX ADMIN — PROPOFOL 200 MCG/KG/MIN: 10 INJECTION, EMULSION INTRAVENOUS at 12:03

## 2023-03-28 RX ADMIN — LIDOCAINE HYDROCHLORIDE 100 MG: 20 INJECTION INTRAVENOUS at 12:03

## 2023-03-28 RX ADMIN — PROPOFOL 80 MG: 10 INJECTION, EMULSION INTRAVENOUS at 12:03

## 2023-03-28 NOTE — PROVATION PATIENT INSTRUCTIONS
Discharge Summary/Instructions after an Endoscopic Procedure  Patient Name: Jam Friedman  Patient MRN: [id number]  Patient YOB: 1960 Tuesday, March 28, 2023  Elaina Giron MD  Dear patient,  As a result of recent federal legislation (The Federal Cures Act), you may   receive lab or pathology results from your procedure in your [facility]   account before your physician is able to contact you. Your physician or   their representative will relay the results to you with their   recommendations at their soonest availability.  Thank you,  RESTRICTIONS:  During your procedure today, you received medications for sedation.  These   medications may affect your judgment, balance and coordination.  Therefore,   for 24 hours, you have the following restrictions:   - DO NOT drive a car, operate machinery, make legal/financial decisions,   sign important papers or drink alcohol.    ACTIVITY:  Today: no heavy lifting, straining or running due to procedural   sedation/anesthesia.  The following day: return to full activity including work.  DIET:  Eat and drink normally unless instructed otherwise.     TREATMENT FOR COMMON SIDE EFFECTS:  - Mild abdominal pain, nausea, belching, bloating or excessive gas:  rest,   eat lightly and use a heating pad.  - Sore Throat: treat with throat lozenges and/or gargle with warm salt   water.  - Because air was used during the procedure, expelling large amounts of air   from your rectum or belching is normal.  - If a bowel prep was taken, you may not have a bowel movement for 1-3 days.    This is normal.  SYMPTOMS TO WATCH FOR AND REPORT TO YOUR PHYSICIAN:  1. Abdominal pain or bloating, other than gas cramps.  2. Chest pain.  3. Back pain.  4. Signs of infection such as: chills or fever occurring within 24 hours   after the procedure.  5. Rectal bleeding, which would show as bright red, maroon, or black stools.   (A tablespoon of blood from the rectum is not serious, especially if    hemorrhoids are present.)  6. Vomiting.  7. Weakness or dizziness.  GO DIRECTLY TO THE NEAREST EMERGENCY ROOM IF YOU HAVE ANY OF THE FOLLOWING:      Difficulty breathing              Chills and/or fever over 101 F   Persistent vomiting and/or vomiting blood   Severe abdominal pain   Severe chest pain   Black, tarry stools   Bleeding- more than one tablespoon   Any other symptom or condition that you feel may need urgent attention  Your doctor recommends these additional instructions:  If any biopsies were taken, your doctors clinic will contact you in 1 to 2   weeks with any results.  - Discharge patient to home (ambulatory).   - Resume regular diet.   - Continue present medications.   - Await pathology results.  For questions, problems or results please call your physician - Elaina Giron MD at Work:  [phone].  [facility], EMERGENCY ROOM PHONE NUMBER: [phone]  IF A COMPLICATION OR EMERGENCY SITUATION ARISES AND YOU ARE UNABLE TO REACH   YOUR PHYSICIAN - GO DIRECTLY TO THE EMERGENCY ROOM.  Elaina Giron MD  3/28/2023 1:08:46 PM  This report has been verified and signed electronically.  Dear patient,  As a result of recent federal legislation (The Federal Cures Act), you may   receive lab or pathology results from your procedure in your [facility]   account before your physician is able to contact you. Your physician or   their representative will relay the results to you with their   recommendations at their soonest availability.  Thank you,  PROVATION

## 2023-03-28 NOTE — TRANSFER OF CARE
"Anesthesia Transfer of Care Note    Patient: Jam Friedman    Procedure(s) Performed: Procedure(s) (LRB):  EGD (ESOPHAGOGASTRODUODENOSCOPY) (N/A)    Patient location: PACU    Anesthesia Type: general    Transport from OR: Transported from OR on room air with adequate spontaneous ventilation    Post pain: adequate analgesia    Post assessment: no apparent anesthetic complications and tolerated procedure well    Post vital signs: stable    Level of consciousness: sedated and responds to stimulation    Nausea/Vomiting: no nausea/vomiting    Complications: none    Transfer of care protocol was followedComments: Bedside report to PACU RN, opportunity for questions given.       Last vitals:   Visit Vitals  /60   Pulse 69   Temp 36.5 °C (97.7 °F) (Temporal)   Resp 16   Ht 5' 5" (1.651 m)   Wt 80.4 kg (177 lb 4 oz)   SpO2 95%   BMI 29.50 kg/m²     "

## 2023-03-28 NOTE — H&P
Short Stay Endoscopy History and Physical    PCP - Andreas Ortega MD  Referring Physician - Nidia Sewell RN  No address on file    Procedure - EGD  ASA - per anesthesia  Mallampati - per anesthesia  History of Anesthesia problems - no  Family history Anesthesia problems -  no   Plan of anesthesia - General    HPI  62 y.o. male  Reason for procedure:  Heartburn [R12]    Symptoms already improved with higher dose of PPI and stopping NSAIDs     ROS:  Constitutional: No fevers, chills, No weight loss  CV: No chest pain  Pulm: No cough, No shortness of breath  GI: see HPI    Medical History:  has a past medical history of Spondylosis of cervical joint.    Surgical History:  has a past surgical history that includes Cervical fusion (2005) and Knee arthroscopy.    Family History: family history includes Heart attack (age of onset: 60) in his father; Hypertension in his mother..    Social History:  reports that he has never smoked. He has never used smokeless tobacco. He reports that he does not drink alcohol and does not use drugs.    Review of patient's allergies indicates:   Allergen Reactions    Benadryl [diphenhydramine hcl] Other (See Comments)     Hyperactivity    Milk containing products        Medications:   Medications Prior to Admission   Medication Sig Dispense Refill Last Dose    azelastine (ASTELIN) 137 mcg (0.1 %) nasal spray 1 spray (137 mcg total) by Nasal route 2 (two) times daily. 30 mL 3     cetirizine (ZYRTEC) 10 MG tablet Take 10 mg by mouth once daily.       diazePAM (VALIUM) 5 MG tablet Take 1 tablet (5 mg total) by mouth once. Take all 1-2 pills at once 30-45 minutes prior to procedure. for 1 dose 2 tablet 0     omeprazole (PRILOSEC) 40 MG capsule Take 1 capsule (40 mg total) by mouth once daily. 30 capsule 11     tadalafiL (CIALIS) 20 MG Tab Take 1 tablet (20 mg total) by mouth as needed (ED). 30 tablet 11     tamsulosin (FLOMAX) 0.4 mg Cap Take 1 capsule (0.4 mg total) by mouth once daily. 90  capsule 3        Physical Exam:    Vital Signs: There were no vitals filed for this visit.    General Appearance: Well appearing in no acute distress  Abdomen: Soft, non tender, non distended with normal bowel sounds, no masses      Labs:  Lab Results   Component Value Date    WBC 6.04 03/15/2023    HGB 15.0 03/15/2023    HCT 45.9 03/15/2023     03/15/2023    CHOL 168 08/18/2016    TRIG 64 08/18/2016    HDL 38 (L) 08/18/2016    ALT 14 08/18/2016    AST 24 08/18/2016     08/18/2016    K 4.4 08/18/2016     08/18/2016    CREATININE 1.2 08/18/2016    BUN 17 08/18/2016    CO2 23 08/18/2016    TSH 1.345 08/18/2016    PSA 8.6 (H) 07/02/2015       I have explained the risks and benefits of this endoscopic procedure to the patient including but not limited to bleeding, inflammation, infection, perforation, and death.    Assessment/Plan:     GERD   NSAID use     - Proceed with EGD     Elaina Giron MD  Gastroenterology   [facility]

## 2023-03-28 NOTE — ANESTHESIA POSTPROCEDURE EVALUATION
Anesthesia Post Evaluation    Patient: Jam Friedman    Procedure(s) Performed: Procedure(s) (LRB):  EGD (ESOPHAGOGASTRODUODENOSCOPY) (N/A)    Final Anesthesia Type: general      Patient location during evaluation: GI PACU  Patient participation: Yes- Able to Participate  Level of consciousness: awake and alert  Post-procedure vital signs: reviewed and stable  Pain management: adequate  Airway patency: patent    PONV status at discharge: No PONV  Anesthetic complications: no      Cardiovascular status: blood pressure returned to baseline  Respiratory status: unassisted and spontaneous ventilation  Hydration status: euvolemic  Follow-up not needed.          Vitals Value Taken Time   /87 03/28/23 1329   Temp 36.7 °C (98.1 °F) 03/28/23 1305   Pulse 60 03/28/23 1329   Resp 16 03/28/23 1329   SpO2 100 % 03/28/23 1329         Event Time   Out of Recovery 13:20:00         Pain/Jed Score: Jed Score: 10 (3/28/2023  1:18 PM)

## 2023-03-30 ENCOUNTER — TELEPHONE (OUTPATIENT)
Dept: UROLOGY | Facility: CLINIC | Age: 63
End: 2023-03-30
Payer: COMMERCIAL

## 2023-03-30 ENCOUNTER — PATIENT MESSAGE (OUTPATIENT)
Dept: UROLOGY | Facility: CLINIC | Age: 63
End: 2023-03-30
Payer: COMMERCIAL

## 2023-03-30 ENCOUNTER — HOSPITAL ENCOUNTER (OUTPATIENT)
Dept: RADIOLOGY | Facility: HOSPITAL | Age: 63
Discharge: HOME | End: 2023-03-30
Attending: UROLOGY
Payer: COMMERCIAL

## 2023-03-30 DIAGNOSIS — R97.20 ELEVATED PSA: ICD-10-CM

## 2023-03-30 PROCEDURE — 72197 MRI PROSTATE W W/O CONTRAST: ICD-10-PCS | Mod: 26,,, | Performed by: INTERNAL MEDICINE

## 2023-03-30 PROCEDURE — 25500020 PHARM REV CODE 255: Performed by: UROLOGY

## 2023-03-30 PROCEDURE — A9585 GADOBUTROL INJECTION: HCPCS | Performed by: UROLOGY

## 2023-03-30 PROCEDURE — 72197 MRI PELVIS W/O & W/DYE: CPT | Mod: 26,,, | Performed by: INTERNAL MEDICINE

## 2023-03-30 PROCEDURE — 72197 MRI PELVIS W/O & W/DYE: CPT | Mod: TC

## 2023-03-30 RX ORDER — GADOBUTROL 604.72 MG/ML
10 INJECTION INTRAVENOUS
Status: COMPLETED | OUTPATIENT
Start: 2023-03-30 | End: 2023-03-30

## 2023-03-30 RX ADMIN — GADOBUTROL 10 ML: 604.72 INJECTION INTRAVENOUS at 06:03

## 2023-03-30 NOTE — TELEPHONE ENCOUNTER
Second call - replied to first and pt has seen Portal message.  Dr. Clarke will be in contact.  Thank you.    ----- Message from Jessie Fernandez LPN sent at 3/30/2023  4:39 PM CDT -----  Contact: pt    ----- Message -----  From: Solis Arriaga  Sent: 3/30/2023   4:26 PM CDT  To: Vince Venegas Staff    Pt requesting call back RE: would like to go over results of mri and plan of treatment      Confirmed contact below:  Contact Name:Jam Friedman  Phone Number: [phone]

## 2023-03-31 ENCOUNTER — TELEPHONE (OUTPATIENT)
Dept: UROLOGY | Facility: CLINIC | Age: 63
End: 2023-03-31
Payer: COMMERCIAL

## 2023-03-31 DIAGNOSIS — R97.20 ELEVATED PSA: Primary | ICD-10-CM

## 2023-03-31 RX ORDER — ENEMA 19; 7 G/133ML; G/133ML
1 ENEMA RECTAL ONCE
Qty: 1 ENEMA | Refills: 0 | Status: SHIPPED | OUTPATIENT
Start: 2023-03-31 | End: 2023-03-31

## 2023-03-31 RX ORDER — CIPROFLOXACIN 500 MG/1
TABLET ORAL
Qty: 1 TABLET | Refills: 0 | Status: SHIPPED | OUTPATIENT
Start: 2023-03-31 | End: 2023-07-06

## 2023-03-31 NOTE — TELEPHONE ENCOUNTER
I SWP Jam now & sched Uronav per below.  All education, prep, etc reviewed and pt will p/u Cipro & Fleets and only tylenol for pain.  Pt repeated back & VU.     4/5/2023 Status: Homer   Appt Time: 2:30 PM Length: 45   Visit Type: TRUS W/ BIOPSY        ----- Message from Theo Clarke MD sent at 3/31/2023  7:01 AM CDT -----  Patient notified of his mri result via portal.  He needs a URONAV biopsy.  Orders placed.  Please reach out to the patient to arrange the biopsy.

## 2023-03-31 NOTE — TELEPHONE ENCOUNTER
I LVM for pt that we will schedule Uronav on a Wed afternoon.  Pt has already scheduled right femur xray for Mon. 4/3/2023.   Thank you.    ----- Message from Theo Clarke MD sent at 3/31/2023  7:01 AM CDT -----  Patient notified of his mri result via portal.  He needs a URONAV biopsy.  Orders placed.  Please reach out to the patient to arrange the biopsy.

## 2023-04-03 ENCOUNTER — HOSPITAL ENCOUNTER (OUTPATIENT)
Dept: RADIOLOGY | Facility: HOSPITAL | Age: 63
Discharge: HOME | End: 2023-04-03
Attending: UROLOGY
Payer: COMMERCIAL

## 2023-04-03 ENCOUNTER — TELEPHONE (OUTPATIENT)
Dept: UROLOGY | Facility: CLINIC | Age: 63
End: 2023-04-03
Payer: COMMERCIAL

## 2023-04-03 DIAGNOSIS — R97.20 ELEVATED PSA: ICD-10-CM

## 2023-04-03 LAB
FINAL PATHOLOGIC DIAGNOSIS: NORMAL
GROSS: NORMAL
Lab: NORMAL

## 2023-04-03 PROCEDURE — 73552 XR FEMUR 2 VIEW RIGHT: ICD-10-PCS | Mod: 26,RT,, | Performed by: RADIOLOGY

## 2023-04-03 PROCEDURE — 73552 X-RAY EXAM OF FEMUR 2/>: CPT | Mod: 26,RT,, | Performed by: RADIOLOGY

## 2023-04-03 PROCEDURE — 73552 X-RAY EXAM OF FEMUR 2/>: CPT | Mod: TC,RT

## 2023-04-03 NOTE — TELEPHONE ENCOUNTER
I SWP sister Nataly, and she will communicate that once Dr. Clarke reviews femur xray, Dr. Clarke will likely order bone scan or perhaps another imaging test.  We will schedule as soon as available.  Pt for Uronav biopsy on this Wed 4/5/2023.  Nataly VU and will call her brother now to explain.  Thank you.

## 2023-04-05 ENCOUNTER — PROCEDURE VISIT (OUTPATIENT)
Dept: UROLOGY | Facility: CLINIC | Age: 63
End: 2023-04-05
Payer: COMMERCIAL

## 2023-04-05 VITALS
SYSTOLIC BLOOD PRESSURE: 186 MMHG | RESPIRATION RATE: 18 BRPM | BODY MASS INDEX: 28.54 KG/M2 | WEIGHT: 171.5 LBS | DIASTOLIC BLOOD PRESSURE: 93 MMHG | TEMPERATURE: 98 F | HEART RATE: 64 BPM

## 2023-04-05 DIAGNOSIS — R97.20 ELEVATED PSA: ICD-10-CM

## 2023-04-05 PROCEDURE — 88341 IMHCHEM/IMCYTCHM EA ADD ANTB: CPT | Mod: 26,,, | Performed by: STUDENT IN AN ORGANIZED HEALTH CARE EDUCATION/TRAINING PROGRAM

## 2023-04-05 PROCEDURE — 96372 PR INJECTION,THERAP/PROPH/DIAG2ST, IM OR SUBCUT: ICD-10-PCS | Mod: 59,S$GLB,, | Performed by: UROLOGY

## 2023-04-05 PROCEDURE — 88342 CHG IMMUNOCYTOCHEMISTRY: ICD-10-PCS | Mod: 26,,, | Performed by: STUDENT IN AN ORGANIZED HEALTH CARE EDUCATION/TRAINING PROGRAM

## 2023-04-05 PROCEDURE — 88305 TISSUE EXAM BY PATHOLOGIST: CPT | Mod: 26,,, | Performed by: STUDENT IN AN ORGANIZED HEALTH CARE EDUCATION/TRAINING PROGRAM

## 2023-04-05 PROCEDURE — 88305 TISSUE EXAM BY PATHOLOGIST: CPT | Performed by: STUDENT IN AN ORGANIZED HEALTH CARE EDUCATION/TRAINING PROGRAM

## 2023-04-05 PROCEDURE — 88341 IMHCHEM/IMCYTCHM EA ADD ANTB: CPT | Mod: 59 | Performed by: STUDENT IN AN ORGANIZED HEALTH CARE EDUCATION/TRAINING PROGRAM

## 2023-04-05 PROCEDURE — 88305 TISSUE EXAM BY PATHOLOGIST: ICD-10-PCS | Mod: 26,,, | Performed by: STUDENT IN AN ORGANIZED HEALTH CARE EDUCATION/TRAINING PROGRAM

## 2023-04-05 PROCEDURE — 76872 US TRANSRECTAL: CPT | Mod: 26,S$GLB,, | Performed by: UROLOGY

## 2023-04-05 PROCEDURE — 55700 PR BIOPSY OF PROSTATE,NEEDLE/PUNCH: CPT | Mod: S$GLB,,, | Performed by: UROLOGY

## 2023-04-05 PROCEDURE — 76872 PR US TRANSRECTAL: ICD-10-PCS | Mod: 26,S$GLB,, | Performed by: UROLOGY

## 2023-04-05 PROCEDURE — 88342 IMHCHEM/IMCYTCHM 1ST ANTB: CPT | Mod: 26,,, | Performed by: STUDENT IN AN ORGANIZED HEALTH CARE EDUCATION/TRAINING PROGRAM

## 2023-04-05 PROCEDURE — 88341 PR IHC OR ICC EACH ADD'L SINGLE ANTIBODY  STAINPR: ICD-10-PCS | Mod: 26,,, | Performed by: STUDENT IN AN ORGANIZED HEALTH CARE EDUCATION/TRAINING PROGRAM

## 2023-04-05 PROCEDURE — 55700 PR BIOPSY OF PROSTATE,NEEDLE/PUNCH: ICD-10-PCS | Mod: S$GLB,,, | Performed by: UROLOGY

## 2023-04-05 PROCEDURE — 88342 IMHCHEM/IMCYTCHM 1ST ANTB: CPT | Mod: 59 | Performed by: STUDENT IN AN ORGANIZED HEALTH CARE EDUCATION/TRAINING PROGRAM

## 2023-04-05 PROCEDURE — 96372 THER/PROPH/DIAG INJ SC/IM: CPT | Mod: 59,S$GLB,, | Performed by: UROLOGY

## 2023-04-05 RX ORDER — LIDOCAINE HYDROCHLORIDE 20 MG/ML
JELLY TOPICAL
Status: COMPLETED | OUTPATIENT
Start: 2023-04-05 | End: 2023-04-05

## 2023-04-05 RX ORDER — LIDOCAINE HYDROCHLORIDE 10 MG/ML
20 INJECTION INFILTRATION; PERINEURAL
Status: COMPLETED | OUTPATIENT
Start: 2023-04-05 | End: 2023-04-05

## 2023-04-05 RX ORDER — CEFTRIAXONE 1 G/1
1 INJECTION, POWDER, FOR SOLUTION INTRAMUSCULAR; INTRAVENOUS
Status: COMPLETED | OUTPATIENT
Start: 2023-04-05 | End: 2023-04-05

## 2023-04-05 RX ADMIN — LIDOCAINE HYDROCHLORIDE: 20 JELLY TOPICAL at 01:04

## 2023-04-05 RX ADMIN — LIDOCAINE HYDROCHLORIDE 20 ML: 10 INJECTION INFILTRATION; PERINEURAL at 02:04

## 2023-04-05 RX ADMIN — CEFTRIAXONE 1 G: 1 INJECTION, POWDER, FOR SOLUTION INTRAMUSCULAR; INTRAVENOUS at 01:04

## 2023-04-05 NOTE — PATIENT INSTRUCTIONS
What to Expect After a Prostate Biopsy    Please be sure to finish your pre-procedure antibiotics as instructed.    You may have mild bleeding from the rectum or urine for about 1 week to 1 month, or in your ejaculate for several months. This bleeding is normal and expected, and it will stop. You may have mild discomfort in your rectal or urethral area for 24-48 hours.    You cannot do any strenuous lifting, straining, or exercising for 24 hours. You may return to full activity the day after the biopsy.    You may continue to take all your regular medications after the procedure except for the blood thinners.    You may resume all blood-thinning medications once you no longer see any bleeding or whenever your physician prescribing the medication says it is all right to do so. You may take Tylenol if you have a fever and your temperature is less than 100° F or if you have some discomfort.    You will receive a call from the Urology Department at [facility] with the results of your prostate biopsy within one week.    Signs and Symptoms to Report    Call your [facility] urologist at [phone] if you develop any of the following:  Temperature greater than 101°  F  Inability to urinate  A large amount of bleeding from the rectum or in the urine  Persistent or severe pain    After hours or on weekends, you may reach a urology resident on call at this number: [phone].

## 2023-04-05 NOTE — PROCEDURES
Procedures    Pre-procedure diagnosis:   1. elevated psa  Lab Results   Component Value Date    PSA 8.6 (H) 07/02/2015    PSA 7.5 (H) 06/28/2014    PSADIAG 17.9 (H) 03/07/2023    PSADIAG 7.7 (H) 08/25/2014       2. PIRADS 4 prostate lesion on MRI; 1.9cm left base anterior      Post-procedure diagnosis: same    Procedure: Transrectal URONAV MRI fusion-ultrasound guided prostate biopsy    TRUS volume: 41cc  MRI volume: 33cc    Complications: none    Blood loss: minimal    Surgeon: Theo Clarke MD    Anesthesia: 10cc lidocaine jelly, 20cc 1% lidocaine for prostatic block    Procedure: The risks and benefits of the procedure were explained to the patient and consent was obtained.  The patient laid in the lateral decubitus position.  10cc of lidocaine jelly was used for local analgesia in the rectum.  The ultrasound probe was advanced into the rectum. The prostate was visualized.  There was not a median lobe.  20cc of 1% lidocaine without epi was used for a prostatic nerve block.    At this point, a sweep of the prostate was performed from base to apex and the transverse plane using the ultrasound and URONAV platform.  Landmarks were then labeled with anterior, posterior, right, left, base and apex were labeled in the axial as well as sagittal planes.  Segmentation was then performed and manual adjustments were made as needed starting in the sagittal plane followed by the axial plane.  At this point, alignment of the ultrasound with MRI images was ensured using the toggle between ultrasound and MRI.      At this point elastic deformation was computed.  Our images were satisfactory.  At this point, we performed 4 biopsies of a target lesion.  Subsequent to that, a standard 12core biopsy was performed, 2 from the apex, mid and base from the right and left side.    The patient tolerated the procedure well and was discharged home.  We will call him when the results are available for review.

## 2023-04-25 ENCOUNTER — PATIENT MESSAGE (OUTPATIENT)
Dept: UROLOGY | Facility: CLINIC | Age: 63
End: 2023-04-25
Payer: COMMERCIAL

## 2023-04-25 DIAGNOSIS — C61 PROSTATE CANCER: Primary | ICD-10-CM

## 2023-04-26 ENCOUNTER — TELEPHONE (OUTPATIENT)
Dept: UROLOGY | Facility: CLINIC | Age: 63
End: 2023-04-26
Payer: COMMERCIAL

## 2023-04-26 NOTE — TELEPHONE ENCOUNTER
I SWP Jam and he will call Taaz tomorrow ( I gave him the number) if Monique is unable to reach him today to schedule Bone Scan.    ----- Message from Funmi Briceño LPN sent at 4/26/2023 12:57 PM CDT -----  Regarding: FW: Pt called to schedule his bone density appt and would like a call back today    ----- Message -----  From: Toya Olivo  Sent: 4/26/2023  12:35 PM CDT  To: Vince Venegas Staff  Subject: Pt called to schedule his bone density appt #    Appointment Access Request:    Pt called to schedule his bone density appt and would like a call back today    Pt can be reached at [phone]

## 2023-04-27 ENCOUNTER — TELEPHONE (OUTPATIENT)
Dept: UROLOGY | Facility: CLINIC | Age: 63
End: 2023-04-27
Payer: COMMERCIAL

## 2023-04-27 ENCOUNTER — PATIENT MESSAGE (OUTPATIENT)
Dept: HEMATOLOGY/ONCOLOGY | Facility: CLINIC | Age: 63
End: 2023-04-27
Payer: COMMERCIAL

## 2023-04-27 NOTE — TELEPHONE ENCOUNTER
Oncology nurse navigator contacted patient for scheduling follow up and radiation oncology referral placed by Dr. Clarke. Patient agreed to appointments on 5/2.  Date, time, and location discussed with patient. All questions/concerns addressed. Patient verbalized understanding. Contact information provided for further assistance.

## 2023-05-01 ENCOUNTER — HOSPITAL ENCOUNTER (OUTPATIENT)
Dept: RADIOLOGY | Facility: HOSPITAL | Age: 63
Discharge: HOME | End: 2023-05-01
Attending: UROLOGY
Payer: COMMERCIAL

## 2023-05-01 DIAGNOSIS — C61 PROSTATE CANCER: ICD-10-CM

## 2023-05-01 PROCEDURE — 78306 NM BONE SCAN WHOLE BODY: ICD-10-PCS | Mod: 26,,, | Performed by: STUDENT IN AN ORGANIZED HEALTH CARE EDUCATION/TRAINING PROGRAM

## 2023-05-01 PROCEDURE — A9503 TC99M MEDRONATE: HCPCS

## 2023-05-01 PROCEDURE — 78306 BONE IMAGING WHOLE BODY: CPT | Mod: 26,,, | Performed by: STUDENT IN AN ORGANIZED HEALTH CARE EDUCATION/TRAINING PROGRAM

## 2023-05-01 PROCEDURE — 78306 BONE IMAGING WHOLE BODY: CPT | Mod: TC

## 2023-05-02 ENCOUNTER — OFFICE VISIT (OUTPATIENT)
Dept: RADIATION ONCOLOGY | Facility: CLINIC | Age: 63
End: 2023-05-02
Payer: COMMERCIAL

## 2023-05-02 ENCOUNTER — OFFICE VISIT (OUTPATIENT)
Dept: UROLOGY | Facility: CLINIC | Age: 63
End: 2023-05-02
Payer: COMMERCIAL

## 2023-05-02 VITALS
HEIGHT: 65 IN | TEMPERATURE: 98 F | BODY MASS INDEX: 28.66 KG/M2 | SYSTOLIC BLOOD PRESSURE: 143 MMHG | WEIGHT: 172 LBS | OXYGEN SATURATION: 100 % | HEART RATE: 77 BPM | DIASTOLIC BLOOD PRESSURE: 79 MMHG | RESPIRATION RATE: 20 BRPM

## 2023-05-02 VITALS
BODY MASS INDEX: 28.74 KG/M2 | HEIGHT: 65 IN | SYSTOLIC BLOOD PRESSURE: 143 MMHG | HEART RATE: 77 BPM | DIASTOLIC BLOOD PRESSURE: 79 MMHG | WEIGHT: 172.5 LBS

## 2023-05-02 DIAGNOSIS — N52.01 ERECTILE DYSFUNCTION DUE TO ARTERIAL INSUFFICIENCY: ICD-10-CM

## 2023-05-02 DIAGNOSIS — C61 PROSTATE CANCER: Primary | ICD-10-CM

## 2023-05-02 PROCEDURE — 3078F PR MOST RECENT DIASTOLIC BLOOD PRESSURE < 80 MM HG: ICD-10-PCS | Mod: CPTII,S$GLB,, | Performed by: UROLOGY

## 2023-05-02 PROCEDURE — 3078F PR MOST RECENT DIASTOLIC BLOOD PRESSURE < 80 MM HG: ICD-10-PCS | Mod: CPTII,S$GLB,, | Performed by: STUDENT IN AN ORGANIZED HEALTH CARE EDUCATION/TRAINING PROGRAM

## 2023-05-02 PROCEDURE — 99205 PR OFFICE/OUTPT VISIT, NEW, LEVL V, 60-74 MIN: ICD-10-PCS | Mod: S$GLB,,, | Performed by: STUDENT IN AN ORGANIZED HEALTH CARE EDUCATION/TRAINING PROGRAM

## 2023-05-02 PROCEDURE — 1160F RVW MEDS BY RX/DR IN RCRD: CPT | Mod: CPTII,S$GLB,, | Performed by: STUDENT IN AN ORGANIZED HEALTH CARE EDUCATION/TRAINING PROGRAM

## 2023-05-02 PROCEDURE — 3008F PR BODY MASS INDEX (BMI) DOCUMENTED: ICD-10-PCS | Mod: CPTII,S$GLB,, | Performed by: STUDENT IN AN ORGANIZED HEALTH CARE EDUCATION/TRAINING PROGRAM

## 2023-05-02 PROCEDURE — 99999 PR PBB SHADOW E&M-EST. PATIENT-LVL V: ICD-10-PCS | Mod: PBBFAC,,, | Performed by: STUDENT IN AN ORGANIZED HEALTH CARE EDUCATION/TRAINING PROGRAM

## 2023-05-02 PROCEDURE — 3008F PR BODY MASS INDEX (BMI) DOCUMENTED: ICD-10-PCS | Mod: CPTII,S$GLB,, | Performed by: UROLOGY

## 2023-05-02 PROCEDURE — 99215 OFFICE O/P EST HI 40 MIN: CPT | Mod: S$GLB,,, | Performed by: UROLOGY

## 2023-05-02 PROCEDURE — 1160F PR REVIEW ALL MEDS BY PRESCRIBER/CLIN PHARMACIST DOCUMENTED: ICD-10-PCS | Mod: CPTII,S$GLB,, | Performed by: UROLOGY

## 2023-05-02 PROCEDURE — 3077F SYST BP >= 140 MM HG: CPT | Mod: CPTII,S$GLB,, | Performed by: STUDENT IN AN ORGANIZED HEALTH CARE EDUCATION/TRAINING PROGRAM

## 2023-05-02 PROCEDURE — 3008F BODY MASS INDEX DOCD: CPT | Mod: CPTII,S$GLB,, | Performed by: UROLOGY

## 2023-05-02 PROCEDURE — 99999 PR PBB SHADOW E&M-EST. PATIENT-LVL III: CPT | Mod: PBBFAC,,, | Performed by: UROLOGY

## 2023-05-02 PROCEDURE — 3077F SYST BP >= 140 MM HG: CPT | Mod: CPTII,S$GLB,, | Performed by: UROLOGY

## 2023-05-02 PROCEDURE — 3077F PR MOST RECENT SYSTOLIC BLOOD PRESSURE >= 140 MM HG: ICD-10-PCS | Mod: CPTII,S$GLB,, | Performed by: UROLOGY

## 2023-05-02 PROCEDURE — 3008F BODY MASS INDEX DOCD: CPT | Mod: CPTII,S$GLB,, | Performed by: STUDENT IN AN ORGANIZED HEALTH CARE EDUCATION/TRAINING PROGRAM

## 2023-05-02 PROCEDURE — 1159F MED LIST DOCD IN RCRD: CPT | Mod: CPTII,S$GLB,, | Performed by: STUDENT IN AN ORGANIZED HEALTH CARE EDUCATION/TRAINING PROGRAM

## 2023-05-02 PROCEDURE — 1160F PR REVIEW ALL MEDS BY PRESCRIBER/CLIN PHARMACIST DOCUMENTED: ICD-10-PCS | Mod: CPTII,S$GLB,, | Performed by: STUDENT IN AN ORGANIZED HEALTH CARE EDUCATION/TRAINING PROGRAM

## 2023-05-02 PROCEDURE — 1159F MED LIST DOCD IN RCRD: CPT | Mod: CPTII,S$GLB,, | Performed by: UROLOGY

## 2023-05-02 PROCEDURE — 3077F PR MOST RECENT SYSTOLIC BLOOD PRESSURE >= 140 MM HG: ICD-10-PCS | Mod: CPTII,S$GLB,, | Performed by: STUDENT IN AN ORGANIZED HEALTH CARE EDUCATION/TRAINING PROGRAM

## 2023-05-02 PROCEDURE — 99999 PR PBB SHADOW E&M-EST. PATIENT-LVL V: CPT | Mod: PBBFAC,,, | Performed by: STUDENT IN AN ORGANIZED HEALTH CARE EDUCATION/TRAINING PROGRAM

## 2023-05-02 PROCEDURE — 1160F RVW MEDS BY RX/DR IN RCRD: CPT | Mod: CPTII,S$GLB,, | Performed by: UROLOGY

## 2023-05-02 PROCEDURE — 1159F PR MEDICATION LIST DOCUMENTED IN MEDICAL RECORD: ICD-10-PCS | Mod: CPTII,S$GLB,, | Performed by: STUDENT IN AN ORGANIZED HEALTH CARE EDUCATION/TRAINING PROGRAM

## 2023-05-02 PROCEDURE — 99215 PR OFFICE/OUTPT VISIT, EST, LEVL V, 40-54 MIN: ICD-10-PCS | Mod: S$GLB,,, | Performed by: UROLOGY

## 2023-05-02 PROCEDURE — 3078F DIAST BP <80 MM HG: CPT | Mod: CPTII,S$GLB,, | Performed by: STUDENT IN AN ORGANIZED HEALTH CARE EDUCATION/TRAINING PROGRAM

## 2023-05-02 PROCEDURE — 99205 OFFICE O/P NEW HI 60 MIN: CPT | Mod: S$GLB,,, | Performed by: STUDENT IN AN ORGANIZED HEALTH CARE EDUCATION/TRAINING PROGRAM

## 2023-05-02 PROCEDURE — 3078F DIAST BP <80 MM HG: CPT | Mod: CPTII,S$GLB,, | Performed by: UROLOGY

## 2023-05-02 PROCEDURE — 1159F PR MEDICATION LIST DOCUMENTED IN MEDICAL RECORD: ICD-10-PCS | Mod: CPTII,S$GLB,, | Performed by: UROLOGY

## 2023-05-02 PROCEDURE — 99999 PR PBB SHADOW E&M-EST. PATIENT-LVL III: ICD-10-PCS | Mod: PBBFAC,,, | Performed by: UROLOGY

## 2023-05-02 NOTE — PROGRESS NOTES
Radiation Oncology Consult Note         Date of Service: 05/02/2023    Chief Complaint: prostate cancer     Reason for visit: consideration for radiation to the pelvis     Referring Physician: Dr Clarke (urology)     Implantable devices: denies     Therapy to Date:  No radiation    Diagnosis/Assessment:   Jam Friedman  is a 63 y.o. man with unfavorable intermediate risk prostate adenocarcinoma Stage IIB (cT1c, cN0, cM0, PSA: 17.9, Grade Group: 2), s/p MRI prostate with 33cc gland, single target, 1.9 cm PI-RADS 4, left base TZ+anterior stromal zone, EPE-, NVBI-, SVI- and LNI-, and  hypointense lesion of right subtrochanteric femur;  s/p NM bone scan with no evidence of mets but focus of faint uptake within posterior right 7th rib; s/p URONAV prostate biopsy (Dr Clarke,4/5/2023) with no median lobe, 5/12 standard cores and 1/1 target involved.    MSK nomogram risk EPE, SVI, LNI (%,%,%): 61,9,9    ECOG 0    Plan   We discussed treatment options per NCCN guidelines v2023:  - EBRT + short-term ADT  - EBRT + brachytherapy boost + short-term ADT  - RP +/- PLND     EBRT would consist of daily radiation treatments M-F, 70Gy in 28 fractions to the prostate gland +/- regional pelvic lymph nodes.      Risks, benefits, and side effects of radiotherapy were discussed.   Side effects include but are not limited to fatigue, erythema, hyperpigmentation, desquamation within the radiation field, more frequent urination, both during the day and at night, urgency with urination, hematuria, dysuria, and a sensation of incomplete emptying.   In addition, the patient will be at risk for increased frequency and/or loose bowel movements.   All of these side effects will go away in the short term.   In the long term, the patient is at risk for radiation cystitis, radiation proctitis, and erectile dysfunction.     The patient would like to think about his options     - no RT consent signed  - we will send prolaris to determine utility of  "ADT  - 5/15/2023 ordered PSMA due to indeterminate lesions on MRI and NM bone scan.  - last colonoscopy up to date "colonoscopy in 2021 which was unremarkable except a gastric ulcer""  - Epic-26 filled on paper 5/2/2023  - 5/22/2023 audio follow-up with patient        HPI:   Jam Friedman  is a 63 y.o. man with  recent diagnosis of prostate cancer after presenting with elevated PSA.    Oncologic history:  7/2/2015 PSA 8.6    3/7/2023 PSA 17.9    3/7/2023 initial eval Dr Clarke   CASEY: Prostate smooth, no nodules, 30 grams.  Seminal vesicles non-palpable    3/30/2023 MRI prostate   33cc gland   Single target, 1.9 cm PI-RADS 4, left base TZ+anterior stromal zone   EPE-, NVBI-, SVI- and LNI-   7 mm T1 hypointense lesion of the right subtrochanteric femur.     4/5/2023 URONAV prostate biopsy (Dr Clarke)   TRUS volume: 41cc, no median lobe   5/12 standard cores involved   1/1 target involved Sergio grade 3 + 4 = 7    5/1/2023 NM bone scan   No convincing scintigraphic evidence of osteoblastic metastasis.   Nonspecific focus of faint uptake within the posterior right 7th rib    Subjective:   In clinic the patient is accompanied by his sisters.  He reports feeling well overall.    The patient denies major urinary or bowel or sexual function issues.  On flomax for BPH  Takes Cialis for ED with satisfactory results  The patient denies other major complaints    AUA score 1 (0,0,0,0,0,0,1) pleased  INES score 25 (5,5,5,5,5) no ED     The patient denies any history of radiation therapy, implantable cardiac devices, or connective tissue disease.    Social history  Lives with his daughter, her , and [place] in [place]  Works at [employer]    Family history  Father prostate cancer diagnosed at age 85, but passed on CVD at age 88      Review of patient's allergies indicates:   Allergen Reactions    Benadryl [diphenhydramine hcl] Other (See Comments)     Hyperactivity    Milk containing products  "       Current Outpatient Medications on File Prior to Visit   Medication Sig Dispense Refill    azelastine (ASTELIN) 137 mcg (0.1 %) nasal spray 1 spray (137 mcg total) by Nasal route 2 (two) times daily. 30 mL 3    cetirizine (ZYRTEC) 10 MG tablet Take 10 mg by mouth once daily.      ciprofloxacin HCl (CIPRO) 500 MG tablet 1 pill one hour before procedure 1 tablet 0    diazePAM (VALIUM) 5 MG tablet Take 1 tablet (5 mg total) by mouth once. Take all 1-2 pills at once 30-45 minutes prior to procedure. for 1 dose 2 tablet 0    omeprazole (PRILOSEC) 40 MG capsule Take 1 capsule (40 mg total) by mouth once daily. 30 capsule 11    tadalafiL (CIALIS) 20 MG Tab Take 1 tablet (20 mg total) by mouth as needed (ED). 30 tablet 11    tamsulosin (FLOMAX) 0.4 mg Cap Take 1 capsule (0.4 mg total) by mouth once daily. 90 capsule 3     No current facility-administered medications on file prior to visit.       Past Surgical History:   Procedure Laterality Date    CERVICAL FUSION  2005    Anterior and posterior    ESOPHAGOGASTRODUODENOSCOPY N/A 3/28/2023    Procedure: EGD (ESOPHAGOGASTRODUODENOSCOPY);  Surgeon: Elaina Giron MD;  [facility] ENDOSCOPY;  Service: Gastroenterology;  Laterality: N/A;  instr via portal  3/24 pre call complete, pt stated he would have a ride -CE    KNEE ARTHROSCOPY         Past Medical History:   Diagnosis Date    Spondylosis of cervical joint          Review of Systems   Constitutional:  Negative for fatigue, fever and unexpected weight change.   HENT:  Positive for sinus pressure/congestion. Negative for hearing loss.    Eyes:  Negative for visual disturbance.   Respiratory:  Negative for cough, shortness of breath and wheezing.    Cardiovascular:  Negative for chest pain, palpitations and leg swelling.   Gastrointestinal:  Negative for abdominal pain, blood in stool, constipation, diarrhea, nausea, vomiting and reflux.   Genitourinary:  Negative for dysuria, frequency, hematuria and urgency.    Musculoskeletal:  Negative for arthralgias, back pain and neck pain.   Integumentary:  Negative for rash.   Neurological:  Negative for dizziness, seizures, speech difficulty, weakness, light-headedness, numbness, headaches and memory loss.   Psychiatric/Behavioral:  Negative for dysphoric mood. The patient is not nervous/anxious.      Objective:      Physical Exam  Vitals reviewed.     Constitutional:       Appearance: Normal appearance.   HENT:      Head: Normocephalic and atraumatic.   Eyes:      Conjunctiva/sclera: Conjunctivae normal.   Pulmonary:      Effort: Pulmonary effort is normal.   Abdominal:      General: There is no distension.   Genitourinary:     Comments: CASEY deferred  Musculoskeletal:         General: Normal range of motion.  Neurological:      General: No focal deficit present.      Mental Status: Alert and oriented  Psychiatric:         Mood and Affect: Mood normal.         Behavior: Behavior normal.      Imaging: I have personally reviewed the patient's available images and reports and summarized pertinent findings above in HPI.      Pathology: I have personally reviewed the patient's available pathology and summarized pertinent findings above in HPI.      Laboratory: I have personally reviewed the patient's available laboratory values and summarized pertinent findings above in HPI.           I spent approximately 60 minutes reviewing the available records and evaluating the patient, out of which over 50% of the time was spent face to face with the patient in counseling and coordinating this patient's care.     Thank you for the opportunity to care for this patient. Please do not hesitate to contact me with any questions.     Alvaro Mcintyre MD/PhD

## 2023-05-02 NOTE — PROGRESS NOTES
Patient ID: Jam Friedman is a 63 y.o. male.    Chief Complaint: prostate cancer    HPI: Jam Friedman is a 63 y.o. Black or  male who presents today for evaluation and management of prostate cancer.    MRI from 3/30/23 showed a 33cc prostate with 1.9cm PIRADS 4 lesion , left base/anterior with broad abutment of the capsule.     Bone scan was negative from 5/1/23.     Has some ED---gets erections without cialis but helps significantly.      BIOPSY PATHOLOGY 4/5/23:   Final Pathologic Diagnosis 1. Prostate, left apex, biopsy:   - Prostate adenocarcinoma, Sergio grade 3 + 3 = 6, involving 30% total of 1/2 cores   - Perineural invasion is not identified     2. Prostate, left middle, biopsy:   - Atypical small acinar proliferation     3. Prostate, left base, biopsy:   - Prostate adenocarcinoma, Sergio grade 3 + 3 = 6, involving 10% total of 2/2 cores   - Perineural invasion is not identified     4. Prostate, right apex, biopsy:   - Atypical small acinar proliferation     5. Prostate, right middle, biopsy:   - Prostate adenocarcinoma, Sergio grade 3 + 3 = 6, involving 10% total of 2/2 cores   - Perineural invasion is not identified     6. Prostate, right base, biopsy:   - Atypical small acinar proliferation     7. Prostate, target lesion, biopsy:   - Prostate adenocarcinoma, Sergio grade 3 + 4 = 7 (<5% pattern 4), involving 30% total of 4/4 cores   - Perineural invasion is not identified              Lab Results   Component Value Date    PSA 8.6 (H) 07/02/2015    PSA 7.5 (H) 06/28/2014    PSADIAG 17.9 (H) 03/07/2023    PSADIAG 7.7 (H) 08/25/2014           Review of patient's allergies indicates:   Allergen Reactions    Benadryl [diphenhydramine hcl] Other (See Comments)     Hyperactivity    Milk containing products        Current Outpatient Medications   Medication Sig Dispense Refill    azelastine (ASTELIN) 137 mcg (0.1 %) nasal spray 1 spray (137 mcg total) by Nasal route 2 (two) times daily.  30 mL 3    cetirizine (ZYRTEC) 10 MG tablet Take 10 mg by mouth once daily.      ciprofloxacin HCl (CIPRO) 500 MG tablet 1 pill one hour before procedure 1 tablet 0    omeprazole (PRILOSEC) 40 MG capsule Take 1 capsule (40 mg total) by mouth once daily. 30 capsule 11    tadalafiL (CIALIS) 20 MG Tab Take 1 tablet (20 mg total) by mouth as needed (ED). 30 tablet 11    tamsulosin (FLOMAX) 0.4 mg Cap Take 1 capsule (0.4 mg total) by mouth once daily. 90 capsule 3    diazePAM (VALIUM) 5 MG tablet Take 1 tablet (5 mg total) by mouth once. Take all 1-2 pills at once 30-45 minutes prior to procedure. for 1 dose 2 tablet 0     No current facility-administered medications for this visit.       Past Medical History:   Diagnosis Date    Spondylosis of cervical joint        Past Surgical History:   Procedure Laterality Date    CERVICAL FUSION  2005    Anterior and posterior    ESOPHAGOGASTRODUODENOSCOPY N/A 3/28/2023    Procedure: EGD (ESOPHAGOGASTRODUODENOSCOPY);  Surgeon: Elaina Giron MD;  Location: [facility] ENDOSCOPY;  Service: Gastroenterology;  Laterality: N/A;  instr via portal  3/24 pre call complete, pt stated he would have a ride -CE    KNEE ARTHROSCOPY         Family History   Problem Relation Age of Onset    Hypertension Mother     Heart attack Father 60        Had CABG in his 80's       Review of Systems    All other systems reviewed and negative except pertinent positives noted in HPI.      Objective:     Vitals:    05/02/23 0912   BP: (!) 143/79   Pulse: 77     Physical Exam   Vitals reviewed.  Constitutional: He is oriented to person, place, and time. He appears well-developed and well-nourished. No distress.   HENT:   Head: Normocephalic and atraumatic.   Right Ear: External ear normal.   Left Ear: External ear normal.   Nose: Nose normal.   Eyes: EOM are normal. Pupils are equal, round, and reactive to light. Right eye exhibits no discharge. Left eye exhibits no discharge.   Neck: Normal range of motion. Neck  supple. No tracheal deviation present. No thyroid enlargement or tenderness.   Cardiovascular: Regular rhythm and intact distal pulses. No signs of peripheral edema.    Pulmonary/Chest: Effort normal and breath sounds normal. No stridor. No respiratory distress. He has no wheezes.   Abdominal: Soft. Bowel sounds are normal. He exhibits no distension. There is no tenderness. Hernia confirmed negative in the right inguinal area and confirmed negative in the left inguinal area. No hepatic or splenic enlargement or tenderness.  Genitourinary: Penis normal. Right testis shows no mass, no swelling and no tenderness. Right testis is descended. Left testis shows no mass, no swelling and no tenderness. Left testis is descended. Circumcised. No phimosis or hypospadias.   ACSEY:deferred for today.  Musculoskeletal: Normal range of motion. He exhibits no edema.   Neurological: He is alert and oriented to person, place, and time. He exhibits normal muscle tone. Coordination normal.   Skin: Skin is warm. No rash noted.   Lymphatic: No palpable lymph nodes.  Psychiatric: He has a normal mood and affect. His behavior is normal. Judgment and thought content normal.     Lab Results   Component Value Date    PSA 8.6 (H) 07/02/2015    PSA 7.5 (H) 06/28/2014     Lab Results   Component Value Date    CREATININE 1.2 08/18/2016     Lab Results   Component Value Date    EGFRNONAA >60.0 08/18/2016     Lab Results   Component Value Date    ESTGFRAFRICA >60.0 08/18/2016       I personally reviewed all the patient's relevant  imaging.    Assessment:       1. Prostate cancer    2. Erectile dysfunction due to arterial insufficiency        Plan:     Diagnoses and all orders for this visit:    Prostate cancer    Erectile dysfunction due to arterial insufficiency        -Today's visit was spent almost entirely on counseling. 45 minutes of counseling time was spent.  We reviewed his diagnosis, stage, grade, and prognosis.  We reviewed the [place] nomograms. We discussed the concept of low risk, moderate risk, and high risk disease. We discussed the different treatment options including active surveillance (as well as surveillance protocol), prostate brachytherapy,external bean radiation, and both open and robotic prostatectomy.We also discussed the advantages, disadvantages, risks and benefits of the different options. Regarding radiation therapy we discussed treatment planning, the different techniques, short and long term complications. These included radiation cystitis, radiation proctitis, and impotence. We discussed success, failure, and salvage therapeutic options.     We discussed surgical therapy in depth including preoperative preparation, surgical technique (including bladder neck and nerve-sparing techniques), postoperative recuperation and recovery, and short and long term complications. We discussed the risks of reoperation, incontinence and impotence. We discussed the chance of rectal injury, obturator nerve injury, and occult injury to the bowel during verress needle access.  We discussed preop and postop Kegels, post op penile rehab, and treatment options for incontinence and impotence. We discussed success, failure and salvage therapeutic options. We discussed the possible  indications for adjuvant radiation therapy.    -CSS   15yr  94%  PFS    5 yr   63%     10yr   47%  OCD  28%  OSMAR   69%  LN+    11%  SV+    11%      At the conclusion of our discussion, the patient will meet with rad/onc and make a treatment decision.     I answered all his questions.    I encouraged him or any of his family members to call or email me with questions and/or concerns.    Continue cialis for ED.

## 2023-05-03 ENCOUNTER — PATIENT MESSAGE (OUTPATIENT)
Dept: HEMATOLOGY/ONCOLOGY | Facility: CLINIC | Age: 63
End: 2023-05-03
Payer: COMMERCIAL

## 2023-05-15 ENCOUNTER — HOSPITAL ENCOUNTER (OUTPATIENT)
Dept: RADIOLOGY | Facility: HOSPITAL | Age: 63
Discharge: HOME | End: 2023-05-15
Attending: STUDENT IN AN ORGANIZED HEALTH CARE EDUCATION/TRAINING PROGRAM
Payer: COMMERCIAL

## 2023-05-15 DIAGNOSIS — C61 PROSTATE CANCER: ICD-10-CM

## 2023-05-15 PROCEDURE — 78815 PET IMAGE W/CT SKULL-THIGH: CPT | Mod: 26,PI,, | Performed by: RADIOLOGY

## 2023-05-15 PROCEDURE — 78815 PET IMAGE W/CT SKULL-THIGH: CPT | Mod: TC

## 2023-05-15 PROCEDURE — 78815 NM PET CT F 18 PYL PSMA, MIDTHIGH TO VERTEX: ICD-10-PCS | Mod: 26,PI,, | Performed by: RADIOLOGY

## 2023-05-22 ENCOUNTER — OFFICE VISIT (OUTPATIENT)
Dept: RADIATION ONCOLOGY | Facility: CLINIC | Age: 63
End: 2023-05-22
Payer: COMMERCIAL

## 2023-05-22 DIAGNOSIS — C61 PROSTATE CANCER: Primary | ICD-10-CM

## 2023-05-22 PROCEDURE — 1159F PR MEDICATION LIST DOCUMENTED IN MEDICAL RECORD: ICD-10-PCS | Mod: CPTII,95,, | Performed by: STUDENT IN AN ORGANIZED HEALTH CARE EDUCATION/TRAINING PROGRAM

## 2023-05-22 PROCEDURE — 99214 OFFICE O/P EST MOD 30 MIN: CPT | Mod: 95,,, | Performed by: STUDENT IN AN ORGANIZED HEALTH CARE EDUCATION/TRAINING PROGRAM

## 2023-05-22 PROCEDURE — 1159F MED LIST DOCD IN RCRD: CPT | Mod: CPTII,95,, | Performed by: STUDENT IN AN ORGANIZED HEALTH CARE EDUCATION/TRAINING PROGRAM

## 2023-05-22 PROCEDURE — 1160F RVW MEDS BY RX/DR IN RCRD: CPT | Mod: CPTII,95,, | Performed by: STUDENT IN AN ORGANIZED HEALTH CARE EDUCATION/TRAINING PROGRAM

## 2023-05-22 PROCEDURE — 99214 PR OFFICE/OUTPT VISIT, EST, LEVL IV, 30-39 MIN: ICD-10-PCS | Mod: 95,,, | Performed by: STUDENT IN AN ORGANIZED HEALTH CARE EDUCATION/TRAINING PROGRAM

## 2023-05-22 PROCEDURE — 1160F PR REVIEW ALL MEDS BY PRESCRIBER/CLIN PHARMACIST DOCUMENTED: ICD-10-PCS | Mod: CPTII,95,, | Performed by: STUDENT IN AN ORGANIZED HEALTH CARE EDUCATION/TRAINING PROGRAM

## 2023-05-22 NOTE — PROGRESS NOTES
Established Patient - Audio Only Telehealth Visit     The patient location is: home  The chief complaint leading to consultation is: prostate cancer, follow-up x 1  Visit type: Virtual visit with audio only (telephone)  Total time spent with patient: 9 min     The reason for the audio only service rather than synchronous audio and video virtual visit was related to technical difficulties or patient preference/necessity.     Each patient to whom I provide medical services by telemedicine is:  (1) informed of the relationship between the physician and patient and the respective role of any other health care provider with respect to management of the patient; and (2) notified that they may decline to receive medical services by telemedicine and may withdraw from such care at any time. Patient verbally consented to receive this service via voice-only telephone call.     This service was not originating from a related E/M service provided within the previous 7 days nor will  to an E/M service or procedure within the next 24 hours or my soonest available appointment.  Prevailing standard of care was able to be met in this audio-only visit.        Radiation Oncology Follow-up  Note                    Date of Service: 05/22/2023     Chief Complaint: prostate cancer     Reason for visit: consideration for radiation to the pelvis,  follow-up x 1    Referring Physician: Dr Clarke (urology)     Implantable devices: denies      Therapy to Date:  No radiation     Diagnosis/Assessment:   Jam Friedman  is a 63 y.o. man with unfavorable intermediate risk prostate adenocarcinoma Stage IIB (cT1c, cN0, cM0, PSA: 17.9, Grade Group: 2), s/p MRI prostate with 33cc gland, single target, 1.9 cm PI-RADS 4, left base TZ+anterior stromal zone, EPE-, NVBI-, SVI- and LNI-, and  hypointense lesion of right subtrochanteric femur;  s/p NM bone scan with no evidence of mets but focus of faint uptake within posterior right 7th rib; s/p  "URONAV prostate biopsy (Dr Clarke,4/5/2023) with no median lobe, 5/12 standard cores and 1/1 target involved. No regional or distant mets on PSMA     MSK nomogram risk EPE, SVI, LNI (%,%,%): 61,9,9     ECOG 0     Plan   We discussed treatment options per NCCN guidelines v2023:  - EBRT + short-term ADT  - EBRT + brachytherapy boost + short-term ADT  - RP +/- PLND     EBRT would consist of daily radiation treatments M-F, 70Gy in 28 fractions to the prostate gland +/- regional pelvic lymph nodes.      Risks, benefits, and side effects of radiotherapy were discussed.   Side effects include but are not limited to fatigue, erythema, hyperpigmentation, desquamation within the radiation field, more frequent urination, both during the day and at night, urgency with urination, hematuria, dysuria, and a sensation of incomplete emptying.   In addition, the patient will be at risk for increased frequency and/or loose bowel movements.   All of these side effects will go away in the short term.   In the long term, the patient is at risk for radiation cystitis, radiation proctitis, and erectile dysfunction.     The patient would like to think about his options     - no RT consent signed  - PSMA with no No regional or distant mets  - prolaris pending to determine utility of ADT  - last colonoscopy up to date "colonoscopy in 2021 which was unremarkable except a gastric ulcer""  - Epic-26 filled on paper 5/2/2023  - will need Follow CT chest and neck  - 6/5/2023 audio follow-up with patient    Interval history       05/02/2023 initial radon viist    5/15/2023 PET    Tracer uptake in the prostate    No definite evidence of metastasis   Nonenlarged right cervical lymph node    0.8 cm left upper lobe pulmonary nodule    [facility] in process Case [id number]    Subjective:   Over the phone he denies any issues, reports feeling well overall,  denies major urinary or bowel or sexual function issues.  On flomax for BPH  Takes Cialis for ED with " satisfactory results  Denies other major complaints     Initial visit questionnaires:  AUA score 1 (0,0,0,0,0,0,1) pleased  INES score 25 (5,5,5,5,5) no ED     The patient denies any history of radiation therapy, implantable cardiac devices, or connective tissue disease.     Social history  Lives with his daughter, her , and [place] in [facility]  Works at [employer]     Family history  Father prostate cancer diagnosed at age 85, but passed on CVD at age 88             Review of patient's allergies indicates:   Allergen Reactions    Benadryl [diphenhydramine hcl] Other (See Comments)       Hyperactivity    Milk containing products                  Current Outpatient Medications on File Prior to Visit   Medication Sig Dispense Refill    azelastine (ASTELIN) 137 mcg (0.1 %) nasal spray 1 spray (137 mcg total) by Nasal route 2 (two) times daily. 30 mL 3    cetirizine (ZYRTEC) 10 MG tablet Take 10 mg by mouth once daily.        ciprofloxacin HCl (CIPRO) 500 MG tablet 1 pill one hour before procedure 1 tablet 0    diazePAM (VALIUM) 5 MG tablet Take 1 tablet (5 mg total) by mouth once. Take all 1-2 pills at once 30-45 minutes prior to procedure. for 1 dose 2 tablet 0    omeprazole (PRILOSEC) 40 MG capsule Take 1 capsule (40 mg total) by mouth once daily. 30 capsule 11    tadalafiL (CIALIS) 20 MG Tab Take 1 tablet (20 mg total) by mouth as needed (ED). 30 tablet 11    tamsulosin (FLOMAX) 0.4 mg Cap Take 1 capsule (0.4 mg total) by mouth once daily. 90 capsule 3      No current facility-administered medications on file prior to visit.               Past Surgical History:   Procedure Laterality Date    CERVICAL FUSION   2005     Anterior and posterior    ESOPHAGOGASTRODUODENOSCOPY N/A 3/28/2023     Procedure: EGD (ESOPHAGOGASTRODUODENOSCOPY);  Surgeon: Elaina Giron MD;  Location: [facility] ENDOSCOPY;  Service: Gastroenterology;  Laterality: N/A;  instr via portal  3/24 pre call complete, pt stated he  would have a ride -CE    KNEE ARTHROSCOPY                  Past Medical History:   Diagnosis Date    Spondylosis of cervical joint              Review of Systems   Negative unless as above          HPI:   Jam Friedman  is a 63 y.o. man with  recent diagnosis of prostate cancer after presenting with elevated PSA.     Oncologic history:  7/2/2015 PSA 8.6     3/7/2023 PSA 17.9     3/7/2023 initial eval Dr Clarke              CASEY: Prostate smooth, no nodules, 30 grams.  Seminal vesicles non-palpable     3/30/2023 MRI prostate              33cc gland              Single target, 1.9 cm PI-RADS 4, left base TZ+anterior stromal zone              EPE-, NVBI-, SVI- and LNI-              7 mm T1 hypointense lesion of the right subtrochanteric femur.                4/5/2023 URONAV prostate biopsy (Dr Clarke)              TRUS volume: 41cc, no median lobe              5/12 standard cores involved              1/1 target involved Sergio grade 3 + 4 = 7     5/1/2023 NM bone scan              No convincing scintigraphic evidence of osteoblastic metastasis.              Nonspecific focus of faint uptake within the posterior right 7th rib    5/2/2023 initial radon visit   - we will send prolaris to determine utility of ADT  - 5/15/2023 ordered PSMA due to indeterminate lesions on MRI and NM bone scan.      Objective:      Physical Exam  No VS or PE      Imaging: I have personally reviewed the patient's available images and reports and summarized pertinent findings above in HPI.      Pathology: I have personally reviewed the patient's available pathology and summarized pertinent findings above in HPI.      Laboratory: I have personally reviewed the patient's available laboratory values and summarized pertinent findings above in HPI.         Thank you for the opportunity to care for this patient. Please do not hesitate to contact me with any questions.     Alvaro Mcintyre MD/PhD

## 2023-06-04 NOTE — PROGRESS NOTES
Established Patient - Audio Only Telehealth Visit     The patient location is: home  The chief complaint leading to consultation is: prostate cancer, follow-up x 2  Visit type: Virtual visit with audio only (telephone)  Total time spent with patient: 7 min     The reason for the audio only service rather than synchronous audio and video virtual visit was related to technical difficulties or patient preference/necessity.     Each patient to whom I provide medical services by telemedicine is:  (1) informed of the relationship between the physician and patient and the respective role of any other health care provider with respect to management of the patient; and (2) notified that they may decline to receive medical services by telemedicine and may withdraw from such care at any time. Patient verbally consented to receive this service via voice-only telephone call.     This service was not originating from a related E/M service provided within the previous 7 days nor will  to an E/M service or procedure within the next 24 hours or my soonest available appointment.  Prevailing standard of care was able to be met in this audio-only visit.          Radiation Oncology Follow-up  Note                    Date of Service: 06/5/2023     Chief Complaint: prostate cancer     Reason for visit: consideration for radiation to the pelvis,  follow-up x 2     Referring Physician: Dr Clarke (urology)     Implantable devices: denies      Therapy to Date:  No radiation     Diagnosis/Assessment:   Jam Friedman  is a 63 y.o. man with unfavorable intermediate risk prostate adenocarcinoma Stage IIB (cT1c, cN0, cM0, PSA: 17.9, Grade Group: 2), s/p MRI prostate with 33cc gland, single target, 1.9 cm PI-RADS 4, left base TZ+anterior stromal zone, EPE-, NVBI-, SVI- and LNI-, and  hypointense lesion of right subtrochanteric femur;  s/p NM bone scan with no evidence of mets but focus of faint uptake within posterior right 7th rib; s/p  "URONAV prostate biopsy (Dr Clarke,4/5/2023) with no median lobe, 5/12 standard cores and 1/1 target involved. No regional or distant mets on PSMA but non-enlarged right cervical lymph node and 0.8 cm left upper lobe pulmonary nodule     MSK nomogram risk EPE, SVI, LNI (%,%,%): 61,9,9  Prolaris score 3.3, candidate for single-modal treatment    ECOG 0     Plan   We discussed treatment options per NCCN guidelines v2023:  - EBRT + short-term ADT  - EBRT + brachytherapy boost + short-term ADT  - RP +/- PLND     EBRT would consist of daily radiation treatments M-F, 70Gy in 28 fractions to the prostate gland +/- regional pelvic lymph nodes.      Risks, benefits, and side effects of radiotherapy were discussed.   Side effects include but are not limited to fatigue, erythema, hyperpigmentation, desquamation within the radiation field, more frequent urination, both during the day and at night, urgency with urination, hematuria, dysuria, and a sensation of incomplete emptying.   In addition, the patient will be at risk for increased frequency and/or loose bowel movements.   All of these side effects will go away in the short term.   In the long term, the patient is at risk for radiation cystitis, radiation proctitis, and erectile dysfunction.     The patient declined surgery and would like radiation therapy:  brachy vs EBRT       - no RT consent signed  - no utility of ADT based on prolaris  - last colonoscopy up to date "colonoscopy in 2021 which was unremarkable except a gastric ulcer""  - Epic-26 filled on paper 5/2/2023  - ordered CT neck+chest since abnormal PET  - ask Dr Miller to comment on brachy          Interval history         [facility]     5/15/2023 PET               Tracer uptake in the prostate               No definite evidence of metastasis              Nonenlarged right cervical lymph node               0.8 cm left upper lobe pulmonary nodule     Prolaris score 3.3: candidate for " single-modal treatment.              Subjective:   Over the phone he denies any new issues or changes from the last phone visit, reports feeling well overall,  denies major urinary or bowel or sexual function issues.  On flomax for BPH  Takes Cialis for ED with satisfactory results  Denies other major complaints     Initial visit questionnaires:  AUA score 1 (0,0,0,0,0,0,1) pleased  INES score 25 (5,5,5,5,5) no ED     The patient denies any history of radiation therapy, implantable cardiac devices, or connective tissue disease.     Social history  Lives with his daughter, her , and neno in [facility]  Works at [employer]    Social History     Tobacco Use    Smoking status: Never    Smokeless tobacco: Never   Substance Use Topics    Alcohol use: No     Alcohol/week: 0.0 standard drinks     Comment: Rare use    Drug use: No     Family history  Father prostate cancer diagnosed at age 85, but passed on CVD at age 88               Review of patient's allergies indicates:   Allergen Reactions    Benadryl [diphenhydramine hcl] Other (See Comments)       Hyperactivity    Milk containing products                    Current Outpatient Medications on File Prior to Visit   Medication Sig Dispense Refill    azelastine (ASTELIN) 137 mcg (0.1 %) nasal spray 1 spray (137 mcg total) by Nasal route 2 (two) times daily. 30 mL 3    cetirizine (ZYRTEC) 10 MG tablet Take 10 mg by mouth once daily.        ciprofloxacin HCl (CIPRO) 500 MG tablet 1 pill one hour before procedure 1 tablet 0    diazePAM (VALIUM) 5 MG tablet Take 1 tablet (5 mg total) by mouth once. Take all 1-2 pills at once 30-45 minutes prior to procedure. for 1 dose 2 tablet 0    omeprazole (PRILOSEC) 40 MG capsule Take 1 capsule (40 mg total) by mouth once daily. 30 capsule 11    tadalafiL (CIALIS) 20 MG Tab Take 1 tablet (20 mg total) by mouth as needed (ED). 30 tablet 11    tamsulosin (FLOMAX) 0.4 mg Cap Take 1 capsule (0.4 mg total) by mouth  once daily. 90 capsule 3      No current facility-administered medications on file prior to visit.                Past Surgical History:   Procedure Laterality Date    CERVICAL FUSION   2005     Anterior and posterior    ESOPHAGOGASTRODUODENOSCOPY N/A 3/28/2023     Procedure: EGD (ESOPHAGOGASTRODUODENOSCOPY);  Surgeon: Elaina Giron MD;  Location: [facility] END[facility];  Service: Gastroenterology;  Laterality: N/A;  instr via portal  3/24 pre call complete, pt stated he would have a ride -CE    KNEE ARTHROSCOPY                  Past Medical History:   Diagnosis Date    Spondylosis of cervical joint        Review of Systems   Negative unless as above      HPI:   Jam Friedman  is a 63 y.o. man with  recent diagnosis of prostate cancer after presenting with elevated PSA.     Oncologic history:  7/2/2015 PSA 8.6     3/7/2023 PSA 17.9     3/7/2023 initial eval Dr Clarke              CASEY: Prostate smooth, no nodules, 30 grams.  Seminal vesicles non-palpable     3/30/2023 MRI prostate              33cc gland              Single target, 1.9 cm PI-RADS 4, left base TZ+anterior stromal zone              EPE-, NVBI-, SVI- and LNI-              7 mm T1 hypointense lesion of the right subtrochanteric femur.                4/5/2023 URONAV prostate biopsy (Dr Clarke)              TRUS volume: 41cc, no median lobe              5/12 standard cores involved              1/1 target involved Sergio grade 3 + 4 = 7     5/1/2023 NM bone scan              No convincing scintigraphic evidence of osteoblastic metastasis.              Nonspecific focus of faint uptake within the posterior right 7th rib     5/2/2023 initial [facility] visit  - we will send prolaris to determine utility of ADT  - 5/15/2023 ordered PSMA due to indeterminate lesions on MRI and NM bone scan.     Objective:      Physical Exam  No VS or PE      Pathology: I have personally reviewed the patient's available pathology and summarized pertinent findings above in HPI.       Thank you for the opportunity to care for this patient. Please do not hesitate to contact me with any questions.     Alvaro Mcintyre MD/PhD

## 2023-06-05 ENCOUNTER — PATIENT MESSAGE (OUTPATIENT)
Dept: RADIATION ONCOLOGY | Facility: CLINIC | Age: 63
End: 2023-06-05

## 2023-06-05 ENCOUNTER — OFFICE VISIT (OUTPATIENT)
Dept: RADIATION ONCOLOGY | Facility: CLINIC | Age: 63
End: 2023-06-05
Payer: COMMERCIAL

## 2023-06-05 DIAGNOSIS — C61 PROSTATE CANCER: Primary | ICD-10-CM

## 2023-06-05 DIAGNOSIS — R59.0 LOCALIZED ENLARGED LYMPH NODES: ICD-10-CM

## 2023-06-05 DIAGNOSIS — R91.1 SOLITARY PULMONARY NODULE: ICD-10-CM

## 2023-06-05 PROCEDURE — 99214 OFFICE O/P EST MOD 30 MIN: CPT | Mod: 95,,, | Performed by: STUDENT IN AN ORGANIZED HEALTH CARE EDUCATION/TRAINING PROGRAM

## 2023-06-05 PROCEDURE — 99214 PR OFFICE/OUTPT VISIT, EST, LEVL IV, 30-39 MIN: ICD-10-PCS | Mod: 95,,, | Performed by: STUDENT IN AN ORGANIZED HEALTH CARE EDUCATION/TRAINING PROGRAM

## 2023-06-05 PROCEDURE — 1159F MED LIST DOCD IN RCRD: CPT | Mod: CPTII,95,, | Performed by: STUDENT IN AN ORGANIZED HEALTH CARE EDUCATION/TRAINING PROGRAM

## 2023-06-05 PROCEDURE — 1160F PR REVIEW ALL MEDS BY PRESCRIBER/CLIN PHARMACIST DOCUMENTED: ICD-10-PCS | Mod: CPTII,95,, | Performed by: STUDENT IN AN ORGANIZED HEALTH CARE EDUCATION/TRAINING PROGRAM

## 2023-06-05 PROCEDURE — 1160F RVW MEDS BY RX/DR IN RCRD: CPT | Mod: CPTII,95,, | Performed by: STUDENT IN AN ORGANIZED HEALTH CARE EDUCATION/TRAINING PROGRAM

## 2023-06-05 PROCEDURE — 1159F PR MEDICATION LIST DOCUMENTED IN MEDICAL RECORD: ICD-10-PCS | Mod: CPTII,95,, | Performed by: STUDENT IN AN ORGANIZED HEALTH CARE EDUCATION/TRAINING PROGRAM

## 2023-06-08 ENCOUNTER — LAB VISIT (OUTPATIENT)
Dept: LAB | Facility: HOSPITAL | Age: 63
End: 2023-06-08
Attending: STUDENT IN AN ORGANIZED HEALTH CARE EDUCATION/TRAINING PROGRAM
Payer: COMMERCIAL

## 2023-06-08 DIAGNOSIS — C61 PROSTATE CANCER: ICD-10-CM

## 2023-06-08 LAB
CREAT SERPL-MCNC: 1.1 MG/DL (ref 0.5–1.4)
EST. GFR  (NO RACE VARIABLE): >60 ML/MIN/1.73 M^2

## 2023-06-08 PROCEDURE — 36415 COLL VENOUS BLD VENIPUNCTURE: CPT | Mod: PN | Performed by: STUDENT IN AN ORGANIZED HEALTH CARE EDUCATION/TRAINING PROGRAM

## 2023-06-08 PROCEDURE — 82565 ASSAY OF CREATININE: CPT | Performed by: STUDENT IN AN ORGANIZED HEALTH CARE EDUCATION/TRAINING PROGRAM

## 2023-06-12 LAB
COMMENT: NORMAL
FINAL PATHOLOGIC DIAGNOSIS: NORMAL
GROSS: NORMAL
Lab: NORMAL
MICROSCOPIC EXAM: NORMAL
SUPPLEMENTAL DIAGNOSIS: NORMAL

## 2023-06-13 ENCOUNTER — HOSPITAL ENCOUNTER (OUTPATIENT)
Dept: RADIOLOGY | Facility: HOSPITAL | Age: 63
Discharge: HOME | End: 2023-06-13
Attending: STUDENT IN AN ORGANIZED HEALTH CARE EDUCATION/TRAINING PROGRAM
Payer: COMMERCIAL

## 2023-06-13 DIAGNOSIS — R59.0 LOCALIZED ENLARGED LYMPH NODES: ICD-10-CM

## 2023-06-13 PROCEDURE — 70491 CT SOFT TISSUE NECK W/DYE: CPT | Mod: TC

## 2023-06-13 PROCEDURE — 71260 CT THORAX DX C+: CPT | Mod: TC

## 2023-06-13 PROCEDURE — 71260 CT CHEST WITH CONTRAST: ICD-10-PCS | Mod: 26,,, | Performed by: STUDENT IN AN ORGANIZED HEALTH CARE EDUCATION/TRAINING PROGRAM

## 2023-06-13 PROCEDURE — 70491 CT SOFT TISSUE NECK WITH CONTRAST: ICD-10-PCS | Mod: 26,,, | Performed by: RADIOLOGY

## 2023-06-13 PROCEDURE — 70491 CT SOFT TISSUE NECK W/DYE: CPT | Mod: 26,,, | Performed by: RADIOLOGY

## 2023-06-13 PROCEDURE — 71260 CT THORAX DX C+: CPT | Mod: 26,,, | Performed by: STUDENT IN AN ORGANIZED HEALTH CARE EDUCATION/TRAINING PROGRAM

## 2023-06-13 PROCEDURE — 25500020 PHARM REV CODE 255: Performed by: STUDENT IN AN ORGANIZED HEALTH CARE EDUCATION/TRAINING PROGRAM

## 2023-06-13 RX ADMIN — IOHEXOL 100 ML: 350 INJECTION, SOLUTION INTRAVENOUS at 04:06

## 2023-07-06 ENCOUNTER — OFFICE VISIT (OUTPATIENT)
Dept: RADIATION ONCOLOGY | Facility: CLINIC | Age: 63
End: 2023-07-06
Payer: COMMERCIAL

## 2023-07-06 VITALS
HEART RATE: 67 BPM | BODY MASS INDEX: 27.89 KG/M2 | HEIGHT: 65 IN | WEIGHT: 167.38 LBS | DIASTOLIC BLOOD PRESSURE: 92 MMHG | SYSTOLIC BLOOD PRESSURE: 158 MMHG | RESPIRATION RATE: 16 BRPM

## 2023-07-06 DIAGNOSIS — C61 PROSTATE CANCER: Primary | ICD-10-CM

## 2023-07-06 PROCEDURE — 99999 PR PBB SHADOW E&M-EST. PATIENT-LVL III: ICD-10-PCS | Mod: PBBFAC,,, | Performed by: RADIOLOGY

## 2023-07-06 PROCEDURE — 3077F SYST BP >= 140 MM HG: CPT | Mod: CPTII,S$GLB,, | Performed by: RADIOLOGY

## 2023-07-06 PROCEDURE — 99999 PR PBB SHADOW E&M-EST. PATIENT-LVL III: CPT | Mod: PBBFAC,,, | Performed by: RADIOLOGY

## 2023-07-06 PROCEDURE — 3080F DIAST BP >= 90 MM HG: CPT | Mod: CPTII,S$GLB,, | Performed by: RADIOLOGY

## 2023-07-06 PROCEDURE — 3077F PR MOST RECENT SYSTOLIC BLOOD PRESSURE >= 140 MM HG: ICD-10-PCS | Mod: CPTII,S$GLB,, | Performed by: RADIOLOGY

## 2023-07-06 PROCEDURE — 1159F MED LIST DOCD IN RCRD: CPT | Mod: CPTII,S$GLB,, | Performed by: RADIOLOGY

## 2023-07-06 PROCEDURE — 3008F BODY MASS INDEX DOCD: CPT | Mod: CPTII,S$GLB,, | Performed by: RADIOLOGY

## 2023-07-06 PROCEDURE — 3008F PR BODY MASS INDEX (BMI) DOCUMENTED: ICD-10-PCS | Mod: CPTII,S$GLB,, | Performed by: RADIOLOGY

## 2023-07-06 PROCEDURE — 3080F PR MOST RECENT DIASTOLIC BLOOD PRESSURE >= 90 MM HG: ICD-10-PCS | Mod: CPTII,S$GLB,, | Performed by: RADIOLOGY

## 2023-07-06 PROCEDURE — 1159F PR MEDICATION LIST DOCUMENTED IN MEDICAL RECORD: ICD-10-PCS | Mod: CPTII,S$GLB,, | Performed by: RADIOLOGY

## 2023-07-06 PROCEDURE — 1160F PR REVIEW ALL MEDS BY PRESCRIBER/CLIN PHARMACIST DOCUMENTED: ICD-10-PCS | Mod: CPTII,S$GLB,, | Performed by: RADIOLOGY

## 2023-07-06 PROCEDURE — 1160F RVW MEDS BY RX/DR IN RCRD: CPT | Mod: CPTII,S$GLB,, | Performed by: RADIOLOGY

## 2023-07-06 PROCEDURE — 99213 OFFICE O/P EST LOW 20 MIN: CPT | Mod: S$GLB,,, | Performed by: RADIOLOGY

## 2023-07-06 PROCEDURE — 99213 PR OFFICE/OUTPT VISIT, EST, LEVL III, 20-29 MIN: ICD-10-PCS | Mod: S$GLB,,, | Performed by: RADIOLOGY

## 2023-07-06 NOTE — PROGRESS NOTES
Subjective     Patient ID: Jam Friedman is a 63 y.o. male.    Chief Complaint: Prostate Cancer (Discuss ebrt+seed implant)    This patient presents for further discussion of radiotherapy for his prostate cancer.      Mr. Friedman was recently diagnosed with Stage IIB (T1c, N0, M0, GG2, PSA 10-20) prostate cancer.  He was initially referred to Urology in 2015 with an elevated PSA of 8.6 ng/ml.  The patient was schedule for biopsy but was lost to follow up.  He was recently referred after PSA on 3/7/23 returned at 17.9 ng/ml.  MRI revealed a 33 cc prostate with a 1.9 cm T2 hypointense lesion in the Lt. transitional zone, PI-RADS 4.  The lesion abutted the capsule without extraprostatic extension.  Biopsies revealed [place] 7 (3+4) adenocarcinoma involving 30% of 4 of 4 cores from the target area.  The [place] pattern 4 accounted for < 5% of the tumor.  There was Sergio 6 (3+3) adenocarcinoma involving 10 - 30% of 5 of 6 cores from the Lt. apex, Lt. base and Rt. mid gland.  There was no perineural invasion.  The remaining 6 cores were negative.  Polaris molecular score indicated single modality treatment.  PSMA scan was negative for regional or distant metastatic disease.  The patient presents for discussion of radiotherapy.  Today the patient states he feels well.     Review of Systems   Constitutional:  Negative for activity change, appetite change, chills and fatigue.   Respiratory:  Negative for cough and shortness of breath.    Gastrointestinal:  Negative for abdominal pain, constipation, diarrhea and fecal incontinence.   Genitourinary:  Negative for bladder incontinence, difficulty urinating, erectile dysfunction, frequency and hematuria.        AUA score 1 (0,0,0,0,0,0,1) pleased  INES score 25 (5,5,5,5,5) no ED          Physical Exam  Constitutional:       General: He is not in acute distress.     Appearance: Normal appearance.   Pulmonary:      Effort: Pulmonary effort is normal. No respiratory distress.    Abdominal:      General: Abdomen is flat. There is no distension.   Neurological:      Mental Status: He is alert and oriented to person, place, and time.   Psychiatric:         Mood and Affect: Mood normal.         Judgment: Judgment normal.        Stage IIB adenocarcinoma of the prostate.      Discussed the options of external beam therapy to the prostate and seminal vesicles followed by boost to the prostate using Iodine 125 seeds.  Discussed the rational for combined therapy.  Discussed the procedures, risks and benefits of therapy.  Reviewed the acute and long term side effects of treatment.  The patient was agreeable to proceed with therapy.  Plan simulation with treatment to begin thereafter.

## 2023-07-13 ENCOUNTER — HOSPITAL ENCOUNTER (OUTPATIENT)
Dept: RADIATION THERAPY | Facility: HOSPITAL | Age: 63
Discharge: HOME | End: 2023-07-13
Attending: RADIOLOGY
Payer: COMMERCIAL

## 2023-07-13 PROCEDURE — 77014 PR  CT GUIDANCE PLACEMENT RAD THERAPY FIELDS: CPT | Mod: 26,,, | Performed by: RADIOLOGY

## 2023-07-13 PROCEDURE — 77334 PR  RADN TREATMENT AID(S) COMPLX: ICD-10-PCS | Mod: 26,,, | Performed by: RADIOLOGY

## 2023-07-13 PROCEDURE — 77263 THER RADIOLOGY TX PLNG CPLX: CPT | Mod: ,,, | Performed by: RADIOLOGY

## 2023-07-13 PROCEDURE — 77334 RADIATION TREATMENT AID(S): CPT | Mod: TC | Performed by: RADIOLOGY

## 2023-07-13 PROCEDURE — 77014 PR  CT GUIDANCE PLACEMENT RAD THERAPY FIELDS: ICD-10-PCS | Mod: 26,,, | Performed by: RADIOLOGY

## 2023-07-13 PROCEDURE — 77334 RADIATION TREATMENT AID(S): CPT | Mod: 26,,, | Performed by: RADIOLOGY

## 2023-07-13 PROCEDURE — 77014 HC CT GUIDANCE RADIATION THERAPY FLDS PLACEMENT: CPT | Mod: TC | Performed by: RADIOLOGY

## 2023-07-13 PROCEDURE — 77263 PR  RADIATION THERAPY PLAN COMPLEX: ICD-10-PCS | Mod: ,,, | Performed by: RADIOLOGY

## 2023-07-21 PROCEDURE — 77301 RADIOTHERAPY DOSE PLAN IMRT: CPT | Mod: 26,,, | Performed by: RADIOLOGY

## 2023-07-21 PROCEDURE — 77301 PR  INTEN MOD RADIOTHER PLAN W/DOSE VOL HIST: ICD-10-PCS | Mod: 26,,, | Performed by: RADIOLOGY

## 2023-07-21 PROCEDURE — 77301 RADIOTHERAPY DOSE PLAN IMRT: CPT | Mod: TC | Performed by: RADIOLOGY

## 2023-07-24 PROCEDURE — 77300 RADIATION THERAPY DOSE PLAN: CPT | Mod: 26,,, | Performed by: RADIOLOGY

## 2023-07-24 PROCEDURE — 77338 DESIGN MLC DEVICE FOR IMRT: CPT | Mod: TC | Performed by: RADIOLOGY

## 2023-07-24 PROCEDURE — 77300 RADIATION THERAPY DOSE PLAN: CPT | Mod: TC | Performed by: RADIOLOGY

## 2023-07-24 PROCEDURE — 77300 PR RADIATION THERAPY,DOSIMETRY PLAN: ICD-10-PCS | Mod: 26,,, | Performed by: RADIOLOGY

## 2023-07-24 PROCEDURE — 77338 PR  MLC IMRT DESIGN & CONSTRUCTION PER IMRT PLAN: ICD-10-PCS | Mod: 26,,, | Performed by: RADIOLOGY

## 2023-07-24 PROCEDURE — 77338 DESIGN MLC DEVICE FOR IMRT: CPT | Mod: 26,,, | Performed by: RADIOLOGY

## 2023-07-25 PROCEDURE — 77014 PR  CT GUIDANCE PLACEMENT RAD THERAPY FIELDS: CPT | Mod: 26,,, | Performed by: RADIOLOGY

## 2023-07-25 PROCEDURE — 77014 PR  CT GUIDANCE PLACEMENT RAD THERAPY FIELDS: ICD-10-PCS | Mod: 26,,, | Performed by: RADIOLOGY

## 2023-07-25 PROCEDURE — 77385 HC IMRT, SIMPLE: CPT | Performed by: RADIOLOGY

## 2023-07-26 ENCOUNTER — TELEPHONE (OUTPATIENT)
Dept: UROLOGY | Facility: CLINIC | Age: 63
End: 2023-07-26
Payer: COMMERCIAL

## 2023-07-26 ENCOUNTER — DOCUMENTATION ONLY (OUTPATIENT)
Dept: RADIATION ONCOLOGY | Facility: CLINIC | Age: 63
End: 2023-07-26
Payer: COMMERCIAL

## 2023-07-26 DIAGNOSIS — C61 PROSTATE CANCER: Primary | ICD-10-CM

## 2023-07-26 PROCEDURE — 77014 PR  CT GUIDANCE PLACEMENT RAD THERAPY FIELDS: CPT | Mod: 26,,, | Performed by: RADIOLOGY

## 2023-07-26 PROCEDURE — 77014 PR  CT GUIDANCE PLACEMENT RAD THERAPY FIELDS: ICD-10-PCS | Mod: 26,,, | Performed by: RADIOLOGY

## 2023-07-26 PROCEDURE — 77385 HC IMRT, SIMPLE: CPT | Performed by: RADIOLOGY

## 2023-07-27 PROCEDURE — 77014 PR  CT GUIDANCE PLACEMENT RAD THERAPY FIELDS: ICD-10-PCS | Mod: 26,,, | Performed by: RADIOLOGY

## 2023-07-27 PROCEDURE — 77385 HC IMRT, SIMPLE: CPT | Performed by: RADIOLOGY

## 2023-07-27 PROCEDURE — 77014 PR  CT GUIDANCE PLACEMENT RAD THERAPY FIELDS: CPT | Mod: 26,,, | Performed by: RADIOLOGY

## 2023-07-28 PROCEDURE — 77014 PR  CT GUIDANCE PLACEMENT RAD THERAPY FIELDS: CPT | Mod: 26,,, | Performed by: RADIOLOGY

## 2023-07-28 PROCEDURE — 77385 HC IMRT, SIMPLE: CPT | Performed by: RADIOLOGY

## 2023-07-28 PROCEDURE — 77014 PR  CT GUIDANCE PLACEMENT RAD THERAPY FIELDS: ICD-10-PCS | Mod: 26,,, | Performed by: RADIOLOGY

## 2023-07-31 PROCEDURE — 77336 RADIATION PHYSICS CONSULT: CPT | Performed by: RADIOLOGY

## 2023-07-31 PROCEDURE — 77014 PR  CT GUIDANCE PLACEMENT RAD THERAPY FIELDS: CPT | Mod: 26,,, | Performed by: RADIOLOGY

## 2023-07-31 PROCEDURE — 77014 PR  CT GUIDANCE PLACEMENT RAD THERAPY FIELDS: ICD-10-PCS | Mod: 26,,, | Performed by: RADIOLOGY

## 2023-07-31 PROCEDURE — 77385 HC IMRT, SIMPLE: CPT | Performed by: RADIOLOGY

## 2023-08-01 ENCOUNTER — HOSPITAL ENCOUNTER (OUTPATIENT)
Dept: RADIATION THERAPY | Facility: HOSPITAL | Age: 63
Discharge: HOME | End: 2023-08-01
Attending: RADIOLOGY
Payer: COMMERCIAL

## 2023-08-01 PROCEDURE — 77427 PR CHG RADIATION,MANGEMENT,5 TX'S: ICD-10-PCS | Mod: ,,, | Performed by: RADIOLOGY

## 2023-08-01 PROCEDURE — 77014 PR  CT GUIDANCE PLACEMENT RAD THERAPY FIELDS: CPT | Mod: 26,,, | Performed by: RADIOLOGY

## 2023-08-01 PROCEDURE — 77427 RADIATION TX MANAGEMENT X5: CPT | Mod: ,,, | Performed by: RADIOLOGY

## 2023-08-01 PROCEDURE — 77014 PR  CT GUIDANCE PLACEMENT RAD THERAPY FIELDS: ICD-10-PCS | Mod: 26,,, | Performed by: RADIOLOGY

## 2023-08-01 PROCEDURE — 77014 HC CT GUIDANCE RADIATION THERAPY FLDS PLACEMENT: CPT | Mod: TC | Performed by: RADIOLOGY

## 2023-08-01 PROCEDURE — 77385 HC IMRT, SIMPLE: CPT | Performed by: RADIOLOGY

## 2023-08-02 ENCOUNTER — DOCUMENTATION ONLY (OUTPATIENT)
Dept: RADIATION ONCOLOGY | Facility: CLINIC | Age: 63
End: 2023-08-02
Payer: COMMERCIAL

## 2023-08-02 DIAGNOSIS — R12 HEARTBURN: ICD-10-CM

## 2023-08-02 PROCEDURE — 77385 HC IMRT, SIMPLE: CPT | Performed by: RADIOLOGY

## 2023-08-02 PROCEDURE — 77014 HC CT GUIDANCE RADIATION THERAPY FLDS PLACEMENT: CPT | Mod: TC | Performed by: RADIOLOGY

## 2023-08-02 PROCEDURE — 77014 PR  CT GUIDANCE PLACEMENT RAD THERAPY FIELDS: CPT | Mod: 26,,, | Performed by: RADIOLOGY

## 2023-08-02 PROCEDURE — 77014 PR  CT GUIDANCE PLACEMENT RAD THERAPY FIELDS: ICD-10-PCS | Mod: 26,,, | Performed by: RADIOLOGY

## 2023-08-03 PROCEDURE — 77014 HC CT GUIDANCE RADIATION THERAPY FLDS PLACEMENT: CPT | Mod: TC | Performed by: RADIOLOGY

## 2023-08-03 PROCEDURE — 77385 HC IMRT, SIMPLE: CPT | Performed by: RADIOLOGY

## 2023-08-03 PROCEDURE — 77014 PR  CT GUIDANCE PLACEMENT RAD THERAPY FIELDS: CPT | Mod: 26,,, | Performed by: RADIOLOGY

## 2023-08-03 PROCEDURE — 77014 PR  CT GUIDANCE PLACEMENT RAD THERAPY FIELDS: ICD-10-PCS | Mod: 26,,, | Performed by: RADIOLOGY

## 2023-08-03 RX ORDER — OMEPRAZOLE 40 MG/1
40 CAPSULE, DELAYED RELEASE ORAL DAILY
Qty: 30 CAPSULE | Refills: 11 | Status: SHIPPED | OUTPATIENT
Start: 2023-08-03 | End: 2024-08-02

## 2023-08-04 PROCEDURE — 77385 HC IMRT, SIMPLE: CPT | Performed by: RADIOLOGY

## 2023-08-04 PROCEDURE — 77014 PR  CT GUIDANCE PLACEMENT RAD THERAPY FIELDS: CPT | Mod: 26,,, | Performed by: RADIOLOGY

## 2023-08-04 PROCEDURE — 77014 PR  CT GUIDANCE PLACEMENT RAD THERAPY FIELDS: ICD-10-PCS | Mod: 26,,, | Performed by: RADIOLOGY

## 2023-08-07 PROCEDURE — 77385 HC IMRT, SIMPLE: CPT | Performed by: RADIOLOGY

## 2023-08-07 PROCEDURE — 77014 PR  CT GUIDANCE PLACEMENT RAD THERAPY FIELDS: ICD-10-PCS | Mod: 26,,, | Performed by: RADIOLOGY

## 2023-08-07 PROCEDURE — 77336 RADIATION PHYSICS CONSULT: CPT | Performed by: RADIOLOGY

## 2023-08-07 PROCEDURE — 77014 PR  CT GUIDANCE PLACEMENT RAD THERAPY FIELDS: CPT | Mod: 26,,, | Performed by: RADIOLOGY

## 2023-08-08 PROCEDURE — 77385 HC IMRT, SIMPLE: CPT | Performed by: RADIOLOGY

## 2023-08-08 PROCEDURE — 77014 PR  CT GUIDANCE PLACEMENT RAD THERAPY FIELDS: ICD-10-PCS | Mod: 26,,, | Performed by: RADIOLOGY

## 2023-08-08 PROCEDURE — 77427 PR CHG RADIATION,MANGEMENT,5 TX'S: ICD-10-PCS | Mod: ,,, | Performed by: RADIOLOGY

## 2023-08-08 PROCEDURE — 77014 PR  CT GUIDANCE PLACEMENT RAD THERAPY FIELDS: CPT | Mod: 26,,, | Performed by: RADIOLOGY

## 2023-08-08 PROCEDURE — 77427 RADIATION TX MANAGEMENT X5: CPT | Mod: ,,, | Performed by: RADIOLOGY

## 2023-08-09 ENCOUNTER — DOCUMENTATION ONLY (OUTPATIENT)
Dept: RADIATION ONCOLOGY | Facility: CLINIC | Age: 63
End: 2023-08-09
Payer: COMMERCIAL

## 2023-08-09 DIAGNOSIS — C61 PROSTATE CANCER: Primary | ICD-10-CM

## 2023-08-09 PROCEDURE — 77014 PR  CT GUIDANCE PLACEMENT RAD THERAPY FIELDS: ICD-10-PCS | Mod: 26,,, | Performed by: RADIOLOGY

## 2023-08-09 PROCEDURE — 77385 HC IMRT, SIMPLE: CPT | Performed by: RADIOLOGY

## 2023-08-09 PROCEDURE — 77014 PR  CT GUIDANCE PLACEMENT RAD THERAPY FIELDS: CPT | Mod: 26,,, | Performed by: RADIOLOGY

## 2023-08-09 NOTE — PLAN OF CARE
Day 12 of outpatient radiation to the prostate. Doing well. No dysuria, hematuria, or nocturia. No diarrhea.

## 2023-08-10 ENCOUNTER — PATIENT MESSAGE (OUTPATIENT)
Dept: SURGERY | Facility: HOSPITAL | Age: 63
End: 2023-08-10
Payer: COMMERCIAL

## 2023-08-10 ENCOUNTER — PATIENT MESSAGE (OUTPATIENT)
Dept: RADIATION ONCOLOGY | Facility: CLINIC | Age: 63
End: 2023-08-10
Payer: COMMERCIAL

## 2023-08-10 PROCEDURE — 77014 PR  CT GUIDANCE PLACEMENT RAD THERAPY FIELDS: CPT | Mod: 26,,, | Performed by: RADIOLOGY

## 2023-08-10 PROCEDURE — 77385 HC IMRT, SIMPLE: CPT | Performed by: RADIOLOGY

## 2023-08-10 PROCEDURE — 77014 PR  CT GUIDANCE PLACEMENT RAD THERAPY FIELDS: ICD-10-PCS | Mod: 26,,, | Performed by: RADIOLOGY

## 2023-08-11 PROCEDURE — 77014 PR  CT GUIDANCE PLACEMENT RAD THERAPY FIELDS: ICD-10-PCS | Mod: 26,,, | Performed by: RADIOLOGY

## 2023-08-11 PROCEDURE — 77014 PR  CT GUIDANCE PLACEMENT RAD THERAPY FIELDS: CPT | Mod: 26,,, | Performed by: RADIOLOGY

## 2023-08-11 PROCEDURE — 77385 HC IMRT, SIMPLE: CPT | Performed by: RADIOLOGY

## 2023-08-14 PROCEDURE — 77014 PR  CT GUIDANCE PLACEMENT RAD THERAPY FIELDS: CPT | Mod: 26,,, | Performed by: RADIOLOGY

## 2023-08-14 PROCEDURE — 77014 PR  CT GUIDANCE PLACEMENT RAD THERAPY FIELDS: ICD-10-PCS | Mod: 26,,, | Performed by: RADIOLOGY

## 2023-08-14 PROCEDURE — 77385 HC IMRT, SIMPLE: CPT | Performed by: RADIOLOGY

## 2023-08-14 PROCEDURE — 77336 RADIATION PHYSICS CONSULT: CPT | Performed by: RADIOLOGY

## 2023-08-15 PROCEDURE — 77427 RADIATION TX MANAGEMENT X5: CPT | Mod: ,,, | Performed by: RADIOLOGY

## 2023-08-15 PROCEDURE — 77014 PR  CT GUIDANCE PLACEMENT RAD THERAPY FIELDS: ICD-10-PCS | Mod: 26,,, | Performed by: RADIOLOGY

## 2023-08-15 PROCEDURE — 77385 HC IMRT, SIMPLE: CPT | Performed by: RADIOLOGY

## 2023-08-15 PROCEDURE — 77014 PR  CT GUIDANCE PLACEMENT RAD THERAPY FIELDS: CPT | Mod: 26,,, | Performed by: RADIOLOGY

## 2023-08-15 PROCEDURE — 77427 PR CHG RADIATION,MANGEMENT,5 TX'S: ICD-10-PCS | Mod: ,,, | Performed by: RADIOLOGY

## 2023-08-16 ENCOUNTER — DOCUMENTATION ONLY (OUTPATIENT)
Dept: RADIATION ONCOLOGY | Facility: CLINIC | Age: 63
End: 2023-08-16
Payer: COMMERCIAL

## 2023-08-16 PROCEDURE — 77014 PR  CT GUIDANCE PLACEMENT RAD THERAPY FIELDS: CPT | Mod: 26,,, | Performed by: RADIOLOGY

## 2023-08-16 PROCEDURE — 77385 HC IMRT, SIMPLE: CPT | Performed by: RADIOLOGY

## 2023-08-16 PROCEDURE — 77014 PR  CT GUIDANCE PLACEMENT RAD THERAPY FIELDS: ICD-10-PCS | Mod: 26,,, | Performed by: RADIOLOGY

## 2023-08-16 NOTE — PLAN OF CARE
Day 17 of outpatient radiation to the prostate. Doing well. No dysuria or hematuria. Nocturia x 2. No diarrhea.

## 2023-08-17 PROCEDURE — 77014 PR  CT GUIDANCE PLACEMENT RAD THERAPY FIELDS: CPT | Mod: 26,,, | Performed by: RADIOLOGY

## 2023-08-17 PROCEDURE — 77014 PR  CT GUIDANCE PLACEMENT RAD THERAPY FIELDS: ICD-10-PCS | Mod: 26,,, | Performed by: RADIOLOGY

## 2023-08-17 PROCEDURE — 77385 HC IMRT, SIMPLE: CPT | Performed by: RADIOLOGY

## 2023-08-18 PROCEDURE — 77014 PR  CT GUIDANCE PLACEMENT RAD THERAPY FIELDS: ICD-10-PCS | Mod: 26,,, | Performed by: RADIOLOGY

## 2023-08-18 PROCEDURE — 77385 HC IMRT, SIMPLE: CPT | Performed by: RADIOLOGY

## 2023-08-18 PROCEDURE — 77014 PR  CT GUIDANCE PLACEMENT RAD THERAPY FIELDS: CPT | Mod: 26,,, | Performed by: RADIOLOGY

## 2023-08-21 PROCEDURE — 77336 RADIATION PHYSICS CONSULT: CPT | Performed by: RADIOLOGY

## 2023-08-21 PROCEDURE — 77014 PR  CT GUIDANCE PLACEMENT RAD THERAPY FIELDS: ICD-10-PCS | Mod: 26,,, | Performed by: RADIOLOGY

## 2023-08-21 PROCEDURE — 77385 HC IMRT, SIMPLE: CPT | Performed by: RADIOLOGY

## 2023-08-21 PROCEDURE — 77014 PR  CT GUIDANCE PLACEMENT RAD THERAPY FIELDS: CPT | Mod: 26,,, | Performed by: RADIOLOGY

## 2023-08-22 PROCEDURE — 77385 HC IMRT, SIMPLE: CPT | Performed by: RADIOLOGY

## 2023-08-22 PROCEDURE — 77014 PR  CT GUIDANCE PLACEMENT RAD THERAPY FIELDS: ICD-10-PCS | Mod: 26,,, | Performed by: RADIOLOGY

## 2023-08-22 PROCEDURE — 77014 PR  CT GUIDANCE PLACEMENT RAD THERAPY FIELDS: CPT | Mod: 26,,, | Performed by: RADIOLOGY

## 2023-08-22 PROCEDURE — 77014 HC CT GUIDANCE RADIATION THERAPY FLDS PLACEMENT: CPT | Mod: TC | Performed by: RADIOLOGY

## 2023-08-23 ENCOUNTER — PATIENT MESSAGE (OUTPATIENT)
Dept: SURGERY | Facility: HOSPITAL | Age: 63
End: 2023-08-23
Payer: COMMERCIAL

## 2023-08-23 ENCOUNTER — PATIENT MESSAGE (OUTPATIENT)
Dept: RADIATION ONCOLOGY | Facility: CLINIC | Age: 63
End: 2023-08-23
Payer: COMMERCIAL

## 2023-08-23 PROCEDURE — 77014 PR  CT GUIDANCE PLACEMENT RAD THERAPY FIELDS: ICD-10-PCS | Mod: 26,,, | Performed by: RADIOLOGY

## 2023-08-23 PROCEDURE — 77385 HC IMRT, SIMPLE: CPT | Performed by: RADIOLOGY

## 2023-08-23 PROCEDURE — 77014 PR  CT GUIDANCE PLACEMENT RAD THERAPY FIELDS: CPT | Mod: 26,,, | Performed by: RADIOLOGY

## 2023-08-24 PROCEDURE — 77295 3-D RADIOTHERAPY PLAN: CPT | Mod: TC | Performed by: RADIOLOGY

## 2023-08-24 PROCEDURE — 77295 PR 3D RADIOTHERAPY PLAN: ICD-10-PCS | Mod: 26,,, | Performed by: RADIOLOGY

## 2023-08-24 PROCEDURE — 77295 3-D RADIOTHERAPY PLAN: CPT | Mod: 26,,, | Performed by: RADIOLOGY

## 2023-08-30 ENCOUNTER — TELEPHONE (OUTPATIENT)
Dept: UROLOGY | Facility: CLINIC | Age: 63
End: 2023-08-30
Payer: COMMERCIAL

## 2023-08-30 PROCEDURE — 77300 RADIATION THERAPY DOSE PLAN: CPT | Mod: TC | Performed by: RADIOLOGY

## 2023-08-30 PROCEDURE — 77300 PR RADIATION THERAPY,DOSIMETRY PLAN: ICD-10-PCS | Mod: 26,,, | Performed by: RADIOLOGY

## 2023-08-30 PROCEDURE — 77300 RADIATION THERAPY DOSE PLAN: CPT | Mod: 26,,, | Performed by: RADIOLOGY

## 2023-08-30 NOTE — TELEPHONE ENCOUNTER
You are scheduled for surgery with  on 8/31/2023. Your arrival time is 1030 am. You are to report to the [facility] located in the back of the [facility] on the [place] side of the building right behind [facility]. You will need someone to drive you home following your surgery.  No alcohol 24 hours before and after and no smoking a few days prior. NOTHING TO EAT OR DRINK AFTER MIDNIGHT THE NIGHT PRIOR TO THE SURGERY INCLUDING GUM, CANDY AND MINTS. Take a shower the night before and the morning of with Hibiclens Antibacterial soap and no lotion, cologne, deodorant or powder in the morning.

## 2023-08-31 ENCOUNTER — ANESTHESIA EVENT (OUTPATIENT)
Dept: SURGERY | Facility: HOSPITAL | Age: 63
End: 2023-08-31
Payer: COMMERCIAL

## 2023-08-31 ENCOUNTER — HOSPITAL ENCOUNTER (OUTPATIENT)
Dept: RADIOLOGY | Facility: HOSPITAL | Age: 63
Discharge: HOME | End: 2023-08-31
Attending: UROLOGY | Admitting: UROLOGY
Payer: COMMERCIAL

## 2023-08-31 ENCOUNTER — ANESTHESIA (OUTPATIENT)
Dept: SURGERY | Facility: HOSPITAL | Age: 63
End: 2023-08-31
Payer: COMMERCIAL

## 2023-08-31 ENCOUNTER — HOSPITAL ENCOUNTER (OUTPATIENT)
Facility: HOSPITAL | Age: 63
Discharge: HOME | End: 2023-08-31
Attending: UROLOGY | Admitting: UROLOGY
Payer: COMMERCIAL

## 2023-08-31 VITALS
TEMPERATURE: 98 F | BODY MASS INDEX: 27.88 KG/M2 | WEIGHT: 167.31 LBS | HEIGHT: 65 IN | OXYGEN SATURATION: 98 % | SYSTOLIC BLOOD PRESSURE: 141 MMHG | HEART RATE: 112 BPM | RESPIRATION RATE: 20 BRPM | DIASTOLIC BLOOD PRESSURE: 80 MMHG

## 2023-08-31 DIAGNOSIS — C61 PROSTATE CANCER: ICD-10-CM

## 2023-08-31 DIAGNOSIS — C61 PROSTATE CANCER: Primary | ICD-10-CM

## 2023-08-31 PROCEDURE — 71000015 HC POSTOP RECOV 1ST HR: Performed by: UROLOGY

## 2023-08-31 PROCEDURE — 77014 CT GUIDANCE RADIATION THERAPY FIELD: CPT | Mod: TC

## 2023-08-31 PROCEDURE — 63600175 PHARM REV CODE 636 W HCPCS: Performed by: NURSE ANESTHETIST, CERTIFIED REGISTERED

## 2023-08-31 PROCEDURE — C2638 BRACHYTX, STRANDED, I-125: HCPCS | Performed by: RADIOLOGY

## 2023-08-31 PROCEDURE — 25000003 PHARM REV CODE 250: Performed by: UROLOGY

## 2023-08-31 PROCEDURE — 77470 SPECIAL RADIATION TREATMENT: CPT | Mod: 59,TC | Performed by: RADIOLOGY

## 2023-08-31 PROCEDURE — 37000008 HC ANESTHESIA 1ST 15 MINUTES: Performed by: UROLOGY

## 2023-08-31 PROCEDURE — 76965 ECHO GUIDANCE RADIOTHERAPY: CPT | Mod: 26,,, | Performed by: UROLOGY

## 2023-08-31 PROCEDURE — 25000003 PHARM REV CODE 250: Performed by: NURSE ANESTHETIST, CERTIFIED REGISTERED

## 2023-08-31 PROCEDURE — 63600175 PHARM REV CODE 636 W HCPCS

## 2023-08-31 PROCEDURE — D9220A PRA ANESTHESIA: ICD-10-PCS | Mod: ANES,,, | Performed by: INTERNAL MEDICINE

## 2023-08-31 PROCEDURE — C2639 BRACHYTX, NON-STRANDED,I-125: HCPCS | Performed by: UROLOGY

## 2023-08-31 PROCEDURE — 55875 PR PERCUT/NEEDLE INSERT,PROSTATE,RADIOISOT: ICD-10-PCS | Mod: ,,, | Performed by: UROLOGY

## 2023-08-31 PROCEDURE — 55875 TRANSPERI NEEDLE PLACE PROS: CPT | Mod: ,,, | Performed by: UROLOGY

## 2023-08-31 PROCEDURE — 77778 APPLY INTERSTIT RADIAT COMPL: CPT | Mod: 26,,, | Performed by: RADIOLOGY

## 2023-08-31 PROCEDURE — 36000704 HC OR TIME LEV I 1ST 15 MIN: Performed by: UROLOGY

## 2023-08-31 PROCEDURE — 36000705 HC OR TIME LEV I EA ADD 15 MIN: Performed by: UROLOGY

## 2023-08-31 PROCEDURE — 37000009 HC ANESTHESIA EA ADD 15 MINS: Performed by: UROLOGY

## 2023-08-31 PROCEDURE — 77470 PR  SPECIAL RADIATION TREATMENT: ICD-10-PCS | Mod: 26,59,, | Performed by: RADIOLOGY

## 2023-08-31 PROCEDURE — 71000044 HC DOSC ROUTINE RECOVERY FIRST HOUR: Performed by: UROLOGY

## 2023-08-31 PROCEDURE — 25000003 PHARM REV CODE 250

## 2023-08-31 PROCEDURE — 71000016 HC POSTOP RECOV ADDL HR: Performed by: UROLOGY

## 2023-08-31 PROCEDURE — D9220A PRA ANESTHESIA: Mod: ANES,,, | Performed by: INTERNAL MEDICINE

## 2023-08-31 PROCEDURE — 77778 PR 77778 - APPLY INTERSTITIAL RADIATION COMPLEX: ICD-10-PCS | Mod: 26,,, | Performed by: RADIOLOGY

## 2023-08-31 PROCEDURE — 76965 CHG SONO GUIDE RADIOELEMT APPLIC: ICD-10-PCS | Mod: 26,,, | Performed by: UROLOGY

## 2023-08-31 PROCEDURE — 77370 RADIATION PHYSICS CONSULT: CPT | Performed by: RADIOLOGY

## 2023-08-31 PROCEDURE — 77790 RADIATION HANDLING: CPT | Mod: TC | Performed by: RADIOLOGY

## 2023-08-31 PROCEDURE — 77778 APPLY INTERSTIT RADIAT COMPL: CPT | Mod: TC | Performed by: RADIOLOGY

## 2023-08-31 PROCEDURE — 77470 SPECIAL RADIATION TREATMENT: CPT | Mod: 26,59,, | Performed by: RADIOLOGY

## 2023-08-31 PROCEDURE — D9220A PRA ANESTHESIA: ICD-10-PCS | Mod: CRNA,,, | Performed by: NURSE ANESTHETIST, CERTIFIED REGISTERED

## 2023-08-31 PROCEDURE — C2639 BRACHYTX, NON-STRANDED,I-125: HCPCS | Performed by: RADIOLOGY

## 2023-08-31 PROCEDURE — D9220A PRA ANESTHESIA: Mod: CRNA,,, | Performed by: NURSE ANESTHETIST, CERTIFIED REGISTERED

## 2023-08-31 RX ORDER — LIDOCAINE HYDROCHLORIDE 20 MG/ML
INJECTION INTRAVENOUS
Status: DISCONTINUED | OUTPATIENT
Start: 2023-08-31 | End: 2023-08-31

## 2023-08-31 RX ORDER — METHYLPREDNISOLONE 4 MG/1
TABLET ORAL
Qty: 21 EACH | Refills: 0 | Status: SHIPPED | OUTPATIENT
Start: 2023-08-31 | End: 2023-09-13 | Stop reason: ALTCHOICE

## 2023-08-31 RX ORDER — PHENYLEPHRINE HYDROCHLORIDE 10 MG/ML
INJECTION INTRAVENOUS
Status: DISCONTINUED | OUTPATIENT
Start: 2023-08-31 | End: 2023-08-31

## 2023-08-31 RX ORDER — HALOPERIDOL 5 MG/ML
0.5 INJECTION INTRAMUSCULAR EVERY 10 MIN PRN
Status: DISCONTINUED | OUTPATIENT
Start: 2023-08-31 | End: 2023-08-31 | Stop reason: HOSPADM

## 2023-08-31 RX ORDER — OXYCODONE HYDROCHLORIDE 5 MG/1
5 TABLET ORAL
Status: DISCONTINUED | OUTPATIENT
Start: 2023-08-31 | End: 2023-08-31 | Stop reason: HOSPADM

## 2023-08-31 RX ORDER — LIDOCAINE HYDROCHLORIDE 20 MG/ML
JELLY TOPICAL
Status: DISCONTINUED | OUTPATIENT
Start: 2023-08-31 | End: 2023-08-31 | Stop reason: HOSPADM

## 2023-08-31 RX ORDER — TAMSULOSIN HYDROCHLORIDE 0.4 MG/1
0.4 CAPSULE ORAL DAILY
Qty: 10 CAPSULE | Refills: 0 | Status: SHIPPED | OUTPATIENT
Start: 2023-08-31 | End: 2023-09-13 | Stop reason: SDUPTHER

## 2023-08-31 RX ORDER — SULFAMETHOXAZOLE AND TRIMETHOPRIM 800; 160 MG/1; MG/1
1 TABLET ORAL 2 TIMES DAILY
Qty: 10 TABLET | Refills: 0 | Status: SHIPPED | OUTPATIENT
Start: 2023-08-31 | End: 2023-09-13 | Stop reason: ALTCHOICE

## 2023-08-31 RX ORDER — DEXAMETHASONE SODIUM PHOSPHATE 4 MG/ML
INJECTION, SOLUTION INTRA-ARTICULAR; INTRALESIONAL; INTRAMUSCULAR; INTRAVENOUS; SOFT TISSUE
Status: DISCONTINUED | OUTPATIENT
Start: 2023-08-31 | End: 2023-08-31

## 2023-08-31 RX ORDER — ONDANSETRON 2 MG/ML
INJECTION INTRAMUSCULAR; INTRAVENOUS
Status: DISCONTINUED | OUTPATIENT
Start: 2023-08-31 | End: 2023-08-31

## 2023-08-31 RX ORDER — SODIUM CHLORIDE 0.9 % (FLUSH) 0.9 %
10 SYRINGE (ML) INJECTION
Status: DISCONTINUED | OUTPATIENT
Start: 2023-08-31 | End: 2023-08-31 | Stop reason: HOSPADM

## 2023-08-31 RX ORDER — IBUPROFEN 800 MG/1
800 TABLET ORAL EVERY 6 HOURS PRN
Qty: 12 TABLET | Refills: 0 | Status: SHIPPED | OUTPATIENT
Start: 2023-08-31 | End: 2023-09-13

## 2023-08-31 RX ORDER — FENTANYL CITRATE 50 UG/ML
INJECTION, SOLUTION INTRAMUSCULAR; INTRAVENOUS
Status: DISCONTINUED | OUTPATIENT
Start: 2023-08-31 | End: 2023-08-31

## 2023-08-31 RX ORDER — PROPOFOL 10 MG/ML
VIAL (ML) INTRAVENOUS
Status: DISCONTINUED | OUTPATIENT
Start: 2023-08-31 | End: 2023-08-31

## 2023-08-31 RX ORDER — EPHEDRINE SULFATE 50 MG/ML
INJECTION, SOLUTION INTRAVENOUS
Status: DISCONTINUED | OUTPATIENT
Start: 2023-08-31 | End: 2023-08-31

## 2023-08-31 RX ADMIN — EPHEDRINE SULFATE 5 MG: 50 INJECTION INTRAVENOUS at 11:08

## 2023-08-31 RX ADMIN — LIDOCAINE HYDROCHLORIDE 100 MG: 20 INJECTION INTRAVENOUS at 11:08

## 2023-08-31 RX ADMIN — PHENYLEPHRINE HYDROCHLORIDE 100 MCG: 10 INJECTION INTRAVENOUS at 11:08

## 2023-08-31 RX ADMIN — SODIUM CHLORIDE: 0.9 INJECTION, SOLUTION INTRAVENOUS at 11:08

## 2023-08-31 RX ADMIN — DEXAMETHASONE SODIUM PHOSPHATE 4 MG: 4 INJECTION, SOLUTION INTRAMUSCULAR; INTRAVENOUS at 11:08

## 2023-08-31 RX ADMIN — FENTANYL CITRATE 25 MCG: 50 INJECTION, SOLUTION INTRAMUSCULAR; INTRAVENOUS at 11:08

## 2023-08-31 RX ADMIN — CEFAZOLIN 2 G: 2 INJECTION, POWDER, FOR SOLUTION INTRAMUSCULAR; INTRAVENOUS at 11:08

## 2023-08-31 RX ADMIN — MIDAZOLAM HYDROCHLORIDE 2 MG: 1 INJECTION, SOLUTION INTRAMUSCULAR; INTRAVENOUS at 10:08

## 2023-08-31 RX ADMIN — PROPOFOL 200 MG: 10 INJECTION, EMULSION INTRAVENOUS at 11:08

## 2023-08-31 RX ADMIN — ONDANSETRON 4 MG: 2 INJECTION INTRAMUSCULAR; INTRAVENOUS at 11:08

## 2023-08-31 NOTE — OP NOTE
[facility]  Operative Note    Date: 08/31/2023    Pre-Op Diagnosis: Prostate Cancer    Post-Op Diagnosis: same    Procedure(s) Performed:   1. Transrectal ultrasound of prostate  2. Ultrasound guided needle placement  3. Radioactive seed implantation in prostate  4. Fluoroscopy <1 hour    Specimen(s): none    Staff Surgeon: Theo Clarke MD; Gael Miller MD    Assistant Surgeon: Lauri Romano MD    Anesthesia: General LMA anesthesia    Indications: Jam Friedman is a 63 y.o. male with prostate cancer. Presents today for brachytherapy seed placement    Findings:   Status post placement of brachytherapy seeds, confirmed with KUB in the OR    Estimated Blood Loss: min    Drains: none    Procedure in detail:  The patient confirmed he had taken his pre-procedure antibiotics and did the bowel prep.  After risks benefits and possible complications of placement of radioactive seed placement were discussed, the patient elected to undergo the procedure and informed consent was obtained. All questions were answered in the pre-operative area. The patient was transferred to cystoscopy suite and placed in the supine position.  SCDs were applied and working.  Anesthesia was administered.  Once adequately sedated, the patient was placed in dorsal lithotomy position and prepped and draped in the usual sterile fashion. Time out was performed, jonathan-procedural antibiotics were confirmed    A 16-[place] Wheeler catheter was advanced into the bladder and 10 mL of sterile water was used to inflate the balloon.  The patient's bladder was emptied completely and filled with 120 mL of  sterile water.    The US probe was introduced into the patient's rectum and the secured with stabilizers in coordination with the patients previous prostate mapping.      A total of 73 preloaded seeds were placed trans perineally into the prostate in accordance to the patients previous prostate mapping under the guidance of radiation  oncology, who was present throughout the procedure.      A KUB was taken at the end of the procedure to confirm seed implantation.       Follow up care:  The patient will follow up with Dr. Miller as scheduled.  He will get a CT scan of the abdomen and pelvis before leaving today. He will be discharged home with prescriptions for antibiotics, medrol dose pack, ibuprofen 800 TID, and flomax      Lauri Romano MD

## 2023-08-31 NOTE — ANESTHESIA PREPROCEDURE EVALUATION
[facility]  Anesthesia Pre-Operative Evaluation       Patient Name: Jam Friedman  YOB: 1960  MRN: [id number]  [facility]: [id number]      Code Status: Prior   Date of Procedure: 8/31/2023  Anesthesia: General Procedure: Procedure(s) (LRB):  INSERTION, RADIOACTIVE SEED, PROSTATE (N/A)  Pre-Operative Diagnosis: Prostate cancer [C61]  Proceduralist: Surgeon(s) and Role:     * Theo Clarke MD - Primary        SUBJECTIVE:   Jam Friedman is a 63 y.o. male who  has a past medical history of Spondylosis of cervical joint. Here for radioactive seed placement     Anticoagulants   Medication Route Frequency       he has a current medication list which includes the following long-term medication(s): omeprazole, tadalafil, cetirizine, and tamsulosin.   ALLERGIES:     Review of patient's allergies indicates:   Allergen Reactions    Benadryl [diphenhydramine hcl] Other (See Comments)     Hyperactivity    Milk containing products (dairy)      LDA:          Lines/Drains/Airways     None               MEDICATIONS:     Antibiotics (From admission, onward)    Start     Stop Route Frequency Ordered    08/31/23 1000  ceFAZolin 2 g in dextrose 5 % in water (D5W) 50 mL IVPB (MB+)         -- IV Once 08/31/23 0945        VTE Risk Mitigation (From admission, onward)         Ordered     IP VTE HIGH RISK PATIENT  Once         08/31/23 0945     Place sequential compression device  Until discontinued         08/31/23 0945              Current Facility-Administered Medications   Medication Dose Route Frequency Provider Last Rate Last Admin    ceFAZolin 2 g in dextrose 5 % in water (D5W) 50 mL IVPB (MB+)  2 g Intravenous Once Lauri Romano MD              History:   There are no hospital problems to display for this patient.    Surgical History:    has a past surgical history that includes Cervical fusion (2005); Knee arthroscopy; and Esophagogastroduodenoscopy (N/A,  "3/28/2023).   Social History:    reports being sexually active and has had partner(s) who are female.  reports that he has never smoked. He has never used smokeless tobacco. He reports that he does not drink alcohol and does not use drugs.     OBJECTIVE:     Vital Signs (Most Recent):  Temp: 36.5 °C (97.7 °F) (08/31/23 0959) Vital Signs Range (Last 24H):  Temp:  [36.5 °C (97.7 °F)]        Body mass index is 27.85 kg/m².   Wt Readings from Last 4 Encounters:   08/31/23 75.9 kg (167 lb 5.3 oz)   07/06/23 75.9 kg (167 lb 6.4 oz)   05/02/23 78 kg (172 lb)   05/02/23 78.2 kg (172 lb 8 oz)     Significant Labs:  Lab Results   Component Value Date    WBC 6.04 03/15/2023    HGB 15.0 03/15/2023    HCT 45.9 03/15/2023     03/15/2023     08/18/2016    K 4.4 08/18/2016     08/18/2016    CREATININE 1.1 06/08/2023    BUN 17 08/18/2016    CO2 23 08/18/2016    GLU 94 08/18/2016    CALCIUM 9.2 08/18/2016    ALKPHOS 108 08/18/2016    ALT 14 08/18/2016    AST 24 08/18/2016    ALBUMIN 3.8 08/18/2016    TROPONINI <0.006 06/21/2014     No LMP for male patient.  No results found for this or any previous visit (from the past 72 hour(s)).    EKG:   Results for orders placed or performed during the hospital encounter of 06/20/14   EKG 12-lead    Collection Time: 06/20/14 11:14 AM    Narrative    Test Reason : Chest Pain    Vent. Rate : 067 BPM     Atrial Rate : 069 BPM     P-R Int : 150 ms          QRS Dur : 112 ms      QT Int : 382 ms       P-R-T Axes : 217 -04 000 degrees     QTc Int : 403 ms      Voltage criteria for left ventricular hypertrophy    Confirmed by Elpidio He MD ([id number]) on 6/22/2014 4:01:48 PM    Referred By: LO RUSSELL           Confirmed By:Elpidio He MD       TTE:  No results found for this or any previous visit.  No results found for: "EF"   No results found for this or any previous visit.  JUDY:  No results found for this or any previous visit.  Stress Test:  No results found for this " "or any previous visit.     LHC:  No results found for this or any previous visit.     PFT:  No results found for: "FEV1", "FVC", "OVC0SDQ", "TLC", "DLCO"   ASSESSMENT/PLAN:         Pre-op Assessment    I have reviewed the Patient Summary Reports.     I have reviewed the Nursing Notes. I have reviewed the NPO Status.      Review of Systems  Anesthesia Hx:  No problems with previous Anesthesia    Social:  Non-Smoker, No Alcohol Use    EENT/Dental:EENT/Dental Normal   Cardiovascular:  Cardiovascular Normal Exercise tolerance: good     Pulmonary:  Pulmonary Normal    Renal/:  Renal/ Normal     Hepatic/GI:  Hepatic/GI Normal        Physical Exam  General: Well nourished, Cooperative, Alert and Oriented    Airway:  Mallampati: III / II  Mouth Opening: Normal  TM Distance: Normal  Tongue: Normal  Neck ROM: Normal ROM    Dental:  Intact    Chest/Lungs:  Clear to auscultation, Normal Respiratory Rate    Heart:  Rate: Normal  Rhythm: Regular Rhythm        Anesthesia Plan  Type of Anesthesia, risks & benefits discussed:    Anesthesia Type: Gen ETT, Gen Supraglottic Airway, Gen Natural Airway  Intra-op Monitoring Plan: Standard ASA Monitors  ASA Score: 2    Ready For Surgery From Anesthesia Perspective.     .      "

## 2023-08-31 NOTE — OP NOTE
[facility]  Operative Note    Date: 08/31/2023    Pre-Op Diagnosis: Prostate Cancer    Post-Op Diagnosis: same    Procedure(s) Performed:   1. Transrectal ultrasound of prostate  2. Ultrasound guided needle placement  3. Radioactive seed implantation in prostate  4. Fluoroscopy <1 hour    Specimen(s): none    Staff Surgeon: Theo Clarke MD; Gael Miller MD    Assistant Surgeon: Lauri Romano MD    Anesthesia: General LMA anesthesia    Indications: Jam Friedman is a 63 y.o. male with prostate cancer. Presents today for brachytherapy seed placement    Findings:   Status post placement of brachytherapy seeds, confirmed with KUB in the OR    Estimated Blood Loss: min    Drains: none    Procedure in detail:  The patient confirmed he had taken his pre-procedure antibiotics and did the bowel prep.  After risks benefits and possible complications of placement of radioactive seed placement were discussed, the patient elected to undergo the procedure and informed consent was obtained. All questions were answered in the pre-operative area. The patient was transferred to cystoscopy suite and placed in the supine position.  SCDs were applied and working.  Anesthesia was administered.  Once adequately sedated, the patient was placed in dorsal lithotomy position and prepped and draped in the usual sterile fashion. Time out was performed, jonathan-procedural antibiotics were confirmed    A 16-[place] Wheeler catheter was advanced into the bladder and 10 mL of sterile water was used to inflate the balloon.  The patient's bladder was emptied completely and filled with 120 mL of  sterile water.    The US probe was introduced into the patient's rectum and the secured with stabilizers in coordination with the patients previous prostate mapping.      A total of 73 preloaded seeds were placed trans perineally into the prostate in accordance to the patients previous prostate mapping under the guidance of radiation  oncology, who was present throughout the procedure.      A KUB was taken at the end of the procedure to confirm seed implantation.       Follow up care:  The patient will follow up with Dr. Miller as scheduled.  He will get a CT scan of the abdomen and pelvis before leaving today. He will be discharged home with prescriptions for antibiotics, medrol dose pack, ibuprofen 800 TID, and flomax      Lauri Romano MD    I have reviewed the operative note performed by Dr. Romano, and I concur with her/his documentation of Jam Friedman. I was present for the critical or key portions of the procedure.

## 2023-08-31 NOTE — H&P
Urology [facility]    HPI:  Jam Friedman is a 63 y.o. male with history of prostate cancer status post XRT.  Presents today for placement of brachytherapy seeds.      ROS:  Neg except per HPI    Past Medical History:   Diagnosis Date    Spondylosis of cervical joint        Past Surgical History:   Procedure Laterality Date    CERVICAL FUSION  2005    Anterior and posterior    ESOPHAGOGASTRODUODENOSCOPY N/A 3/28/2023    Procedure: EGD (ESOPHAGOGASTRODUODENOSCOPY);  Surgeon: Elaina Giron MD;  Location: [facility] ENDOSCOPY;  Service: Gastroenterology;  Laterality: N/A;  instr via portal  3/24 pre call complete, pt stated he would have a ride -CE    KNEE ARTHROSCOPY         Social History     Socioeconomic History    Marital status: Single    Number of children: 3   Occupational History    Occupation: Receptor [employer]     Employer: [employer]   Tobacco Use    Smoking status: Never    Smokeless tobacco: Never   Substance and Sexual Activity    Alcohol use: No     Alcohol/week: 0.0 standard drinks of alcohol     Comment: Rare use    Drug use: No    Sexual activity: Yes     Partners: Female       Family History   Problem Relation Age of Onset    Hypertension Mother     Heart attack Father 60        Had CABG in his 80's       Review of patient's allergies indicates:   Allergen Reactions    Benadryl [diphenhydramine hcl] Other (See Comments)     Hyperactivity    Milk containing products (dairy)        No current facility-administered medications on file prior to encounter.     Current Outpatient Medications on File Prior to Encounter   Medication Sig Dispense Refill    cetirizine (ZYRTEC) 10 MG tablet Take 10 mg by mouth once daily.      tadalafiL (CIALIS) 20 MG Tab Take 1 tablet (20 mg total) by mouth as needed (ED). 30 tablet 11    tamsulosin (FLOMAX) 0.4 mg Cap Take 1 capsule (0.4 mg total) by mouth once daily. (Patient not taking: Reported on 8/30/2023) 90 capsule 3       Anticoagulation:  No    Physical  "Exam:  Estimated body mass index is 27.85 kg/m² as calculated from the following:    Height as of this encounter: 5' 5" (1.651 m).    Weight as of this encounter: 75.9 kg (167 lb 5.3 oz).    General: No acute distress, well developed. AAOx3  Head: Normocephalic, Atraumatic  Eyes: Extra-occular movements intact, No discharge  Neck: supple, symmetrical, trachea midline  Lungs: normal respiratory effort, no respiratory distress, no wheezes  CV: regular rate, 2+ pulses  Abdomen: soft, non-tender, non-distended, no organomegaly  MSK: no edema, no deformities, normal ROM  Skin: skin color, texture, turgor normal.  Neurologic: no focal deficits, sensation intact    Labs:    Urine dipstick today - negative for all components    Lab Results   Component Value Date    WBC 6.04 03/15/2023    HGB 15.0 03/15/2023    HCT 45.9 03/15/2023    MCV 97 03/15/2023     03/15/2023           BMP  Lab Results   Component Value Date     08/18/2016    K 4.4 08/18/2016     08/18/2016    CO2 23 08/18/2016    BUN 17 08/18/2016    CREATININE 1.1 06/08/2023    CALCIUM 9.2 08/18/2016    ANIONGAP 9 08/18/2016    EGFRNORACEVR >60.0 06/08/2023         Assessment: Jam Friedman is a 63 y.o. male with prostate cancer. Presents today for placement of brachytherapy seeds.    Plan:     1. To OR for brachytherapy seed placement.  2. Consents signed   3. I have explained the risk, benefits, and alternatives of the procedure in detail. The patient voices understanding and all questions have been answered. The patient agrees to proceed as planned.     Lauri Romano MD    "

## 2023-08-31 NOTE — PATIENT INSTRUCTIONS
Post Prostate Brachyytherapy Seed Implantation Instructions  Do not strain to have a bowel movement  No strenuous exercise x 7 days  No driving while you are on narcotic pain medications or if your head  catheter is in place    You can expect:  See a small amount of  blood in your urine    If you have a catheter, please return to the ER if your catheter stops draining or you are having abdominal pain.    Call the doctor if:  Temperature is greater than 101F  Persistent vomiting and inability to keep food down  Inability to void if you do not have a catheter

## 2023-08-31 NOTE — ANESTHESIA POSTPROCEDURE EVALUATION
Anesthesia Post Evaluation    Patient: Jam Friedman    Procedure(s) Performed: Procedure(s) (LRB):  INSERTION, RADIOACTIVE SEED, PROSTATE (N/A)  ULTRASOUND, RECTAL APPROACH (N/A)    Final Anesthesia Type: general      Patient location during evaluation: PACU  Patient participation: Yes- Able to Participate  Level of consciousness: awake and alert  Post-procedure vital signs: reviewed and stable  Pain management: adequate  Airway patency: patent    PONV status at discharge: No PONV  Anesthetic complications: no      Cardiovascular status: blood pressure returned to baseline  Respiratory status: unassisted and spontaneous ventilation  Hydration status: euvolemic  Follow-up not needed.          Vitals Value Taken Time   /95 08/31/23 1245   Temp 36.6 °C (97.9 °F) 08/31/23 1213   Pulse 111 08/31/23 1301   Resp 20 08/31/23 1245   SpO2 97 % 08/31/23 1301   Vitals shown include unvalidated device data.      No case tracking events are documented in the log.      Pain/Jed Score: Jed Score: 10 (8/31/2023 12:20 PM)

## 2023-08-31 NOTE — BRIEF OP NOTE
[facility] - [facility] (1st Fl)  Brief Operative Note    Surgery Date: 8/31/2023     Surgeon(s) and Role:     * Theo Clarke MD - Primary     * Lauri Romano MD - Resident - Assisting     * Gael Miller Jr., MD        Pre-op Diagnosis:  Prostate cancer [C61]    Post-op Diagnosis:  Post-Op Diagnosis Codes:     * Prostate cancer [C61]    Procedure(s) (LRB):  INSERTION, RADIOACTIVE SEED, PROSTATE (N/A)  ULTRASOUND, RECTAL APPROACH (N/A)    Anesthesia: General    Operative Findings:   Status post placement of brachytherapy seeds, confirmed with KUB in the OR    Estimated Blood Loss: * No values recorded between 8/31/2023 11:33 AM and 8/31/2023 12:09 PM *         Specimens:   Specimen (24h ago, onward)      None              Discharge Note    OUTCOME: Patient tolerated treatment/procedure well without complication and is now ready for discharge.    DISPOSITION: Home or Self Care    FINAL DIAGNOSIS:  Prostate Cancer    FOLLOWUP: In clinic    DISCHARGE INSTRUCTIONS:  No discharge procedures on file.

## 2023-08-31 NOTE — TRANSFER OF CARE
"Anesthesia Transfer of Care Note    Patient: Jam Friedman    Procedure(s) Performed: Procedure(s) (LRB):  INSERTION, RADIOACTIVE SEED, PROSTATE (N/A)  ULTRASOUND, RECTAL APPROACH (N/A)    Patient location: PACU    Anesthesia Type: general    Transport from OR: Transported from OR on 6-10 L/min O2 by face mask with adequate spontaneous ventilation    Post pain: adequate analgesia    Post assessment: no apparent anesthetic complications and tolerated procedure well    Post vital signs: stable    Level of consciousness: awake    Nausea/Vomiting: no nausea/vomiting    Complications: none    Transfer of care protocol was followed      Last vitals:   Visit Vitals  BP (!) 146/79   Pulse 94   Temp 36.6 °C (97.9 °F) (Temporal)   Resp 18   Ht 5' 5" (1.651 m)   Wt 75.9 kg (167 lb 5.3 oz)   SpO2 97%   BMI 27.85 kg/m²     "

## 2023-09-01 ENCOUNTER — PATIENT MESSAGE (OUTPATIENT)
Dept: UROLOGY | Facility: CLINIC | Age: 63
End: 2023-09-01
Payer: COMMERCIAL

## 2023-09-01 ENCOUNTER — HOSPITAL ENCOUNTER (OUTPATIENT)
Dept: RADIATION THERAPY | Facility: HOSPITAL | Age: 63
Discharge: HOME | End: 2023-09-01
Attending: RADIOLOGY
Payer: COMMERCIAL

## 2023-09-04 ENCOUNTER — PATIENT MESSAGE (OUTPATIENT)
Dept: RADIATION ONCOLOGY | Facility: CLINIC | Age: 63
End: 2023-09-04
Payer: COMMERCIAL

## 2023-09-07 ENCOUNTER — DOCUMENTATION ONLY (OUTPATIENT)
Dept: RADIATION ONCOLOGY | Facility: CLINIC | Age: 63
End: 2023-09-07
Payer: COMMERCIAL

## 2023-09-07 RX ORDER — MIDAZOLAM HYDROCHLORIDE 1 MG/ML
INJECTION, SOLUTION INTRAMUSCULAR; INTRAVENOUS
Status: DISCONTINUED | OUTPATIENT
Start: 2023-08-31 | End: 2023-09-07

## 2023-09-07 NOTE — ADDENDUM NOTE
Addendum  created 09/07/23 1441 by Lilibeth Funez CRNA    Intraprocedure Meds edited, Orders acknowledged in Narrator

## 2023-09-07 NOTE — PLAN OF CARE
Completed 22/22 of outpatient radiation to the prostate, on August 23rd. Tolerated therapy well. Seed implant boost scheduled for 8/31.

## 2023-09-11 PROCEDURE — 77318 BRACHYTX ISODOSE COMPLEX: CPT | Mod: 26,,, | Performed by: RADIOLOGY

## 2023-09-11 PROCEDURE — 77318 PR BRACYTHERAPY ISODOSE PLAN; COMPLEX: ICD-10-PCS | Mod: 26,,, | Performed by: RADIOLOGY

## 2023-09-11 PROCEDURE — 77318 BRACHYTX ISODOSE COMPLEX: CPT | Mod: TC | Performed by: RADIOLOGY

## 2023-09-13 ENCOUNTER — OFFICE VISIT (OUTPATIENT)
Dept: RADIATION ONCOLOGY | Facility: CLINIC | Age: 63
End: 2023-09-13
Payer: COMMERCIAL

## 2023-09-13 VITALS
BODY MASS INDEX: 28.76 KG/M2 | HEIGHT: 65 IN | SYSTOLIC BLOOD PRESSURE: 142 MMHG | RESPIRATION RATE: 16 BRPM | WEIGHT: 172.63 LBS | DIASTOLIC BLOOD PRESSURE: 80 MMHG | HEART RATE: 72 BPM

## 2023-09-13 DIAGNOSIS — C61 PROSTATE CANCER: Primary | ICD-10-CM

## 2023-09-13 PROCEDURE — 99999 PR PBB SHADOW E&M-EST. PATIENT-LVL III: ICD-10-PCS | Mod: PBBFAC,,, | Performed by: RADIOLOGY

## 2023-09-13 PROCEDURE — 99999 PR PBB SHADOW E&M-EST. PATIENT-LVL III: CPT | Mod: PBBFAC,,, | Performed by: RADIOLOGY

## 2023-09-13 PROCEDURE — 99024 PR POST-OP FOLLOW-UP VISIT: ICD-10-PCS | Mod: S$GLB,,, | Performed by: RADIOLOGY

## 2023-09-13 PROCEDURE — 99024 POSTOP FOLLOW-UP VISIT: CPT | Mod: S$GLB,,, | Performed by: RADIOLOGY

## 2023-09-13 RX ORDER — TAMSULOSIN HYDROCHLORIDE 0.4 MG/1
1 CAPSULE ORAL DAILY
Qty: 90 CAPSULE | Refills: 3 | Status: SHIPPED | OUTPATIENT
Start: 2023-09-13

## 2023-09-13 NOTE — PROGRESS NOTES
[facility] - Radiation Oncology    Patient ID: Jam Friedman is a 63 y.o. male.    Chief Complaint: Prostate Cancer (S/p brachytherapy)    Mr. Friedman was recently diagnosed with Stage IIB (T1c, N0, M0, GG2, PSA 10-20) prostate cancer.  He was recently referred after PSA on 3/7/23 returned at 17.9 ng/ml.  MRI revealed a 33 cc prostate with a 1.9 cm T2 hypointense lesion in the Lt. transitional zone, PI-RADS 4.  The lesion abutted the capsule without extraprostatic extension.  Biopsies revealed Sergio 7 (3+4) adenocarcinoma involving 30% of 4 of 4 cores from the target area.  The Sergio pattern 4 accounted for < 5% of the tumor.  There was Sergio 6 (3+3) adenocarcinoma involving 10 - 30% of 5 of 6 cores from the Lt. apex, Lt. base and Rt. mid gland.  There was no perineural invasion.  The remaining 6 cores were negative.  Polaris molecular score indicated single modality treatment.  The patient completed 44 Gy to the prostate and seminal vesicles followed by a boost to the prostate using Iodine 125 seeds on 8/31/23.  The patient states he feels well.  No complaints.       Review of Systems   Constitutional:  Negative for activity change, appetite change, chills, diaphoresis and fatigue.   Respiratory:  Negative for cough and shortness of breath.    Gastrointestinal:  Negative for abdominal pain, constipation, diarrhea and fecal incontinence.   Genitourinary:  Negative for bladder incontinence, difficulty urinating, dysuria, erectile dysfunction, frequency and hematuria.       Physical Exam  Constitutional:       General: He is not in acute distress.     Appearance: Normal appearance.   Pulmonary:      Effort: Pulmonary effort is normal. No respiratory distress.   Abdominal:      General: Abdomen is flat. There is no distension.      Palpations: Abdomen is soft.   Neurological:      Mental Status: He is alert and oriented to person, place, and time.   Psychiatric:         Mood and Affect:  Mood normal.         Judgment: Judgment normal.            Assessment and Plan      Prostate cancer   Doing well tolerating the acute effects of therapy well.  Will plan to continue tamsulosin.  Plan follow up in 2 - 3 months with PSA.

## 2023-09-21 ENCOUNTER — PATIENT MESSAGE (OUTPATIENT)
Dept: RADIATION ONCOLOGY | Facility: CLINIC | Age: 63
End: 2023-09-21
Payer: COMMERCIAL

## 2023-09-22 ENCOUNTER — PATIENT MESSAGE (OUTPATIENT)
Dept: INTERNAL MEDICINE | Facility: CLINIC | Age: 63
End: 2023-09-22
Payer: COMMERCIAL

## 2023-09-23 ENCOUNTER — IMMUNIZATION (OUTPATIENT)
Dept: PRIMARY CARE CLINIC | Facility: CLINIC | Age: 63
End: 2023-09-23
Payer: COMMERCIAL

## 2023-09-23 DIAGNOSIS — Z23 NEEDS FLU SHOT: Primary | ICD-10-CM

## 2023-09-23 PROCEDURE — 90471 IMMUNIZATION ADMIN: CPT | Mod: S$GLB,,, | Performed by: FAMILY MEDICINE

## 2023-09-23 PROCEDURE — 90686 IIV4 VACC NO PRSV 0.5 ML IM: CPT | Mod: S$GLB,,, | Performed by: FAMILY MEDICINE

## 2023-09-23 PROCEDURE — 90471 FLU VACCINE (QUAD) GREATER THAN OR EQUAL TO 3YO PRESERVATIVE FREE IM: ICD-10-PCS | Mod: S$GLB,,, | Performed by: FAMILY MEDICINE

## 2023-09-23 PROCEDURE — 90686 FLU VACCINE (QUAD) GREATER THAN OR EQUAL TO 3YO PRESERVATIVE FREE IM: ICD-10-PCS | Mod: S$GLB,,, | Performed by: FAMILY MEDICINE

## 2023-09-24 ENCOUNTER — PATIENT MESSAGE (OUTPATIENT)
Dept: INTERNAL MEDICINE | Facility: CLINIC | Age: 63
End: 2023-09-24
Payer: COMMERCIAL

## 2023-10-11 ENCOUNTER — PATIENT MESSAGE (OUTPATIENT)
Dept: INTERNAL MEDICINE | Facility: CLINIC | Age: 63
End: 2023-10-11
Payer: COMMERCIAL

## 2023-10-13 ENCOUNTER — PATIENT MESSAGE (OUTPATIENT)
Dept: INTERNAL MEDICINE | Facility: CLINIC | Age: 63
End: 2023-10-13
Payer: COMMERCIAL

## 2024-01-03 ENCOUNTER — LAB VISIT (OUTPATIENT)
Dept: LAB | Facility: HOSPITAL | Age: 64
End: 2024-01-03
Attending: RADIOLOGY
Payer: COMMERCIAL

## 2024-01-03 DIAGNOSIS — C61 PROSTATE CANCER: ICD-10-CM

## 2024-01-03 LAB — COMPLEXED PSA SERPL-MCNC: 2.4 NG/ML (ref 0–4)

## 2024-01-03 PROCEDURE — 84153 ASSAY OF PSA TOTAL: CPT | Performed by: RADIOLOGY

## 2024-01-03 PROCEDURE — 36415 COLL VENOUS BLD VENIPUNCTURE: CPT | Performed by: RADIOLOGY

## 2024-02-19 ENCOUNTER — TELEPHONE (OUTPATIENT)
Dept: RADIATION ONCOLOGY | Facility: CLINIC | Age: 64
End: 2024-02-19
Payer: COMMERCIAL

## 2024-03-05 DIAGNOSIS — N52.01 ERECTILE DYSFUNCTION DUE TO ARTERIAL INSUFFICIENCY: ICD-10-CM

## 2024-03-05 RX ORDER — TADALAFIL 20 MG/1
20 TABLET ORAL DAILY PRN
Qty: 30 TABLET | Refills: 0 | Status: SHIPPED | OUTPATIENT
Start: 2024-03-05 | End: 2024-04-03

## 2024-03-07 ENCOUNTER — OFFICE VISIT (OUTPATIENT)
Dept: RADIATION ONCOLOGY | Facility: CLINIC | Age: 64
End: 2024-03-07
Payer: COMMERCIAL

## 2024-03-07 VITALS
SYSTOLIC BLOOD PRESSURE: 132 MMHG | HEIGHT: 65 IN | HEART RATE: 71 BPM | DIASTOLIC BLOOD PRESSURE: 75 MMHG | WEIGHT: 162.25 LBS | RESPIRATION RATE: 16 BRPM | BODY MASS INDEX: 27.03 KG/M2

## 2024-03-07 DIAGNOSIS — C61 PROSTATE CANCER: Primary | ICD-10-CM

## 2024-03-07 PROCEDURE — 1160F RVW MEDS BY RX/DR IN RCRD: CPT | Mod: CPTII,S$GLB,, | Performed by: RADIOLOGY

## 2024-03-07 PROCEDURE — 3078F DIAST BP <80 MM HG: CPT | Mod: CPTII,S$GLB,, | Performed by: RADIOLOGY

## 2024-03-07 PROCEDURE — 99999 PR PBB SHADOW E&M-EST. PATIENT-LVL III: CPT | Mod: PBBFAC,,, | Performed by: RADIOLOGY

## 2024-03-07 PROCEDURE — 3075F SYST BP GE 130 - 139MM HG: CPT | Mod: CPTII,S$GLB,, | Performed by: RADIOLOGY

## 2024-03-07 PROCEDURE — 99212 OFFICE O/P EST SF 10 MIN: CPT | Mod: S$GLB,,, | Performed by: RADIOLOGY

## 2024-03-07 PROCEDURE — 3008F BODY MASS INDEX DOCD: CPT | Mod: CPTII,S$GLB,, | Performed by: RADIOLOGY

## 2024-03-07 PROCEDURE — 1159F MED LIST DOCD IN RCRD: CPT | Mod: CPTII,S$GLB,, | Performed by: RADIOLOGY

## 2024-03-07 NOTE — PROGRESS NOTES
[facility] - Radiation Oncology     Patient ID: Jam Friedman is a 63 y.o. male.    Chief Complaint: Prostate Cancer (6 mo f/u;psa)      Mr. Friedman was recently diagnosed with Stage IIB (T1c, N0, M0, GG2, PSA 10-20) prostate cancer.  He was recently referred after PSA on 3/7/23 returned at 17.9 ng/ml.  MRI revealed a 33 cc prostate with a 1.9 cm T2 hypointense lesion in the Lt. transitional zone, PI-RADS 4.  The lesion abutted the capsule without extraprostatic extension.  Biopsies revealed Chinle 7 (3+4) adenocarcinoma involving 30% of 4 of 4 cores from the target area.  The Chinle pattern 4 accounted for < 5% of the tumor.  There was Sergio 6 (3+3) adenocarcinoma involving 10 - 30% of 5 of 6 cores from the Lt. apex, Lt. base and Rt. mid gland.  There was no perineural invasion.  The remaining 6 cores were negative.  Polaris molecular score indicated single modality treatment.  The patient completed 44 Gy to the prostate and seminal vesicles followed by a boost to the prostate using Iodine 125 seeds on 8/31/23.  The patient states he feels well.  No complaints.       Review of Systems   Constitutional:  Negative for activity change, appetite change, chills and fatigue.   Gastrointestinal:  Negative for abdominal pain, constipation, diarrhea and fecal incontinence.   Genitourinary:  Positive for erectile dysfunction (corrected with Cialis which works well.). Negative for bladder incontinence, difficulty urinating, dysuria, frequency and hematuria.        No significant nocturia.  strong urinary stream. taking tamsulosin       Physical Exam  Constitutional:       General: He is not in acute distress.     Appearance: Normal appearance.   Abdominal:      General: Abdomen is flat. There is no distension.   Neurological:      Mental Status: He is alert and oriented to person, place, and time.   Psychiatric:         Mood and Affect: Mood normal.         Judgment: Judgment normal.           Latest Reference Range & Units 03/07/23 13:57 01/03/24 08:01   PSA Diagnostic 0.00 - 4.00 ng/mL 17.9 (H) 2.4   (H): Data is abnormally high       Assessment and Plan   Prostate Cancer - responding well to radiotherapy.  No evidence of progressive or recurrent disease.  Will plan PSA in 6 months with phone review.  RTC in 1 year with PSA.

## 2024-04-01 DIAGNOSIS — N52.01 ERECTILE DYSFUNCTION DUE TO ARTERIAL INSUFFICIENCY: ICD-10-CM

## 2024-04-03 RX ORDER — TADALAFIL 20 MG/1
20 TABLET ORAL DAILY PRN
Qty: 30 TABLET | Refills: 11 | Status: SHIPPED | OUTPATIENT
Start: 2024-04-03

## 2024-07-27 DIAGNOSIS — R12 HEARTBURN: ICD-10-CM

## 2024-07-29 RX ORDER — OMEPRAZOLE 40 MG/1
40 CAPSULE, DELAYED RELEASE ORAL
Qty: 90 CAPSULE | Refills: 0 | Status: SHIPPED | OUTPATIENT
Start: 2024-07-29

## 2024-09-09 ENCOUNTER — LAB VISIT (OUTPATIENT)
Dept: LAB | Facility: HOSPITAL | Age: 64
End: 2024-09-09
Attending: RADIOLOGY
Payer: COMMERCIAL

## 2024-09-09 DIAGNOSIS — C61 PROSTATE CANCER: ICD-10-CM

## 2024-09-09 LAB — COMPLEXED PSA SERPL-MCNC: 1.8 NG/ML (ref 0–4)

## 2024-09-09 PROCEDURE — 36415 COLL VENOUS BLD VENIPUNCTURE: CPT | Performed by: RADIOLOGY

## 2024-09-09 PROCEDURE — 84153 ASSAY OF PSA TOTAL: CPT | Performed by: RADIOLOGY

## 2024-09-14 ENCOUNTER — TELEPHONE (OUTPATIENT)
Dept: RADIATION ONCOLOGY | Facility: CLINIC | Age: 64
End: 2024-09-14
Payer: COMMERCIAL

## 2024-09-14 NOTE — TELEPHONE ENCOUNTER
Phone rev-lab results, no answer. Left a voicemail requesting a call back. Contact information provided.

## 2024-09-14 NOTE — TELEPHONE ENCOUNTER
----- Message from Gael Miller Jr., MD sent at 9/11/2024  9:26 AM CDT -----  Please contact the patient and let them know PSA looks good  will see him in 6 months with psa

## 2024-09-19 ENCOUNTER — TELEPHONE (OUTPATIENT)
Dept: PRIMARY CARE CLINIC | Facility: CLINIC | Age: 64
End: 2024-09-19
Payer: COMMERCIAL

## 2024-09-27 ENCOUNTER — OFFICE VISIT (OUTPATIENT)
Dept: URGENT CARE | Facility: CLINIC | Age: 64
End: 2024-09-27
Payer: COMMERCIAL

## 2024-09-27 VITALS
TEMPERATURE: 97 F | HEART RATE: 67 BPM | SYSTOLIC BLOOD PRESSURE: 162 MMHG | OXYGEN SATURATION: 97 % | DIASTOLIC BLOOD PRESSURE: 88 MMHG | RESPIRATION RATE: 16 BRPM

## 2024-09-27 DIAGNOSIS — S46.812A TRAPEZIUS STRAIN, LEFT, INITIAL ENCOUNTER: ICD-10-CM

## 2024-09-27 DIAGNOSIS — M54.6 ACUTE LEFT-SIDED THORACIC BACK PAIN: Primary | ICD-10-CM

## 2024-09-27 PROCEDURE — 72070 X-RAY EXAM THORAC SPINE 2VWS: CPT | Mod: S$GLB,,, | Performed by: RADIOLOGY

## 2024-09-27 RX ORDER — NAPROXEN 500 MG/1
500 TABLET ORAL 2 TIMES DAILY WITH MEALS
Qty: 20 TABLET | Refills: 0 | Status: SHIPPED | OUTPATIENT
Start: 2024-09-28 | End: 2024-10-08

## 2024-09-27 RX ORDER — TIZANIDINE 4 MG/1
4 TABLET ORAL EVERY 8 HOURS PRN
Qty: 15 TABLET | Refills: 0 | Status: SHIPPED | OUTPATIENT
Start: 2024-09-27 | End: 2024-10-02

## 2024-09-27 RX ORDER — DEXAMETHASONE SODIUM PHOSPHATE 4 MG/ML
8 INJECTION, SOLUTION INTRA-ARTICULAR; INTRALESIONAL; INTRAMUSCULAR; INTRAVENOUS; SOFT TISSUE
Status: COMPLETED | OUTPATIENT
Start: 2024-09-27 | End: 2024-09-27

## 2024-09-27 RX ORDER — KETOROLAC TROMETHAMINE 30 MG/ML
60 INJECTION, SOLUTION INTRAMUSCULAR; INTRAVENOUS
Status: COMPLETED | OUTPATIENT
Start: 2024-09-27 | End: 2024-09-27

## 2024-09-27 RX ADMIN — DEXAMETHASONE SODIUM PHOSPHATE 8 MG: 4 INJECTION, SOLUTION INTRA-ARTICULAR; INTRALESIONAL; INTRAMUSCULAR; INTRAVENOUS; SOFT TISSUE at 04:09

## 2024-09-27 RX ADMIN — KETOROLAC TROMETHAMINE 60 MG: 30 INJECTION, SOLUTION INTRAMUSCULAR; INTRAVENOUS at 04:09

## 2024-09-27 NOTE — PROGRESS NOTES
Subjective:      Patient ID: Jam Friedman is a 64 y.o. male.    Vitals:  temperature is 97.4 °F (36.3 °C). His blood pressure is 162/88 (abnormal) and his pulse is 67. His respiration is 16 and oxygen saturation is 97%.     Chief Complaint: Mid-back Pain    Patient is a 64-year-old male with a past medical history of BPH, prostate cancer and ED who presents to clinic for evaluation of back pain.  Patient reports symptoms x5 days.  Patient reports has been moving some heavy boxes.  Patient states likely pulled a muscle.  Patient reports that he has done over-the-counter medications and Biofreeze along with massage therapy with no significant relief to symptoms.  Patient reports that he has experienced constant pain that feels like a pulled muscle, spasm or soreness at this area.  Patient reports pain is aggravated by pressing on this location or sleeping on that side.  Patient states pain at current is a 5 on a 10 scale but as worst an 8 or 9 on 10 scale.  Patient states pain is isolated to this area and does not radiate to any other location.  Patient reports no prior trauma or injury at this location.  Patient denies any chest pain, shortness of breath, abdominal pain, nausea or vomiting, headaches or dizziness, fever or chills.        Constitution: Negative. Negative for chills, sweating, fatigue and fever.   HENT: Negative.     Neck: neck negative. Negative for neck pain.   Cardiovascular: Negative.  Negative for chest pain and palpitations.   Eyes: Negative.    Respiratory: Negative.  Negative for chest tightness, cough and shortness of breath.    Gastrointestinal: Negative.  Negative for abdominal pain, nausea, vomiting and diarrhea.   Endocrine: negative.   Genitourinary: Negative.    Musculoskeletal:  Positive for trauma (Moving boxes) and back pain (Left upper back). Negative for muscle ache.   Skin: Negative.  Negative for color change, pale, rash, wound and erythema.   Allergic/Immunologic: Negative.     Neurological: Negative.  Negative for dizziness, light-headedness, passing out, headaches, disorientation, altered mental status, numbness and tingling.   Hematologic/Lymphatic: Negative.    Psychiatric/Behavioral: Negative.  Negative for altered mental status, disorientation and confusion.       Objective:     Physical Exam   Constitutional: He is oriented to person, place, and time. He appears well-developed. He is cooperative.  Non-toxic appearance. He does not appear ill. No distress.   HENT:   Head: Normocephalic and atraumatic.   Ears:   Right Ear: Hearing and external ear normal.   Left Ear: Hearing and external ear normal.   Nose: Nose normal. No mucosal edema or nasal deformity. No epistaxis. Right sinus exhibits no maxillary sinus tenderness and no frontal sinus tenderness. Left sinus exhibits no maxillary sinus tenderness and no frontal sinus tenderness.   Mouth/Throat: Uvula is midline, oropharynx is clear and moist and mucous membranes are normal. Mucous membranes are moist. No trismus in the jaw. Normal dentition. No uvula swelling. Oropharynx is clear.   Eyes: Conjunctivae and lids are normal. Pupils are equal, round, and reactive to light. Right eye exhibits no discharge. Left eye exhibits no discharge. No scleral icterus.   Neck: Trachea normal and phonation normal. Neck supple. No neck rigidity present.   Cardiovascular: Normal rate, regular rhythm, normal heart sounds and normal pulses.   Pulmonary/Chest: Effort normal and breath sounds normal. No respiratory distress. He has no wheezes. He has no rhonchi. He has no rales.   Abdominal: Normal appearance and bowel sounds are normal. He exhibits no distension. Soft. There is no abdominal tenderness.   Musculoskeletal: Normal range of motion.         General: Normal range of motion.      Cervical back: He exhibits no tenderness.      Thoracic back: He exhibits tenderness and spasm. He exhibits normal range of motion, no bony tenderness and no  swelling.        Arms:       Comments: Left paraspinal thoracic muscular tenderness primarily along the trapezius without swelling, skin discoloration, skin rash or lesion.  No bony deformity or tenderness noted down the neck and spine. No decreased range of motion.   Lymphadenopathy:     He has no cervical adenopathy.   Neurological: He is alert and oriented to person, place, and time. He exhibits normal muscle tone.   Skin: Skin is warm, dry, intact, not diaphoretic, not pale and no rash. Capillary refill takes less than 2 seconds. No erythema   Psychiatric: His speech is normal and behavior is normal. Judgment and thought content normal.   Nursing note and vitals reviewed.      Assessment:     1. Acute left-sided thoracic back pain    2. Trapezius strain, left, initial encounter        Plan:       Acute left-sided thoracic back pain  -     XR THORACIC SPINE AP LATERAL; Future; Expected date: 09/27/2024  -     EKG 12-lead; Future    Trapezius strain, left, initial encounter    Other orders  -     ketorolac injection 60 mg  -     dexAMETHasone injection 8 mg  -     naproxen (NAPROSYN) 500 MG tablet; Take 1 tablet (500 mg total) by mouth 2 (two) times daily with meals. for 10 days  Dispense: 20 tablet; Refill: 0  -     tiZANidine (ZANAFLEX) 4 MG tablet; Take 1 tablet (4 mg total) by mouth every 8 (eight) hours as needed (Back pain).  Dispense: 15 tablet; Refill: 0                EKG:  Sinus rhythm rate of 60 beats per minute.   MS,  MS,  MS.  No STEMI identified.  EKG with artifact.  X-ray thoracic spine: Minor scoliosis. Prior anterior cervical fusion at C6-7. Degenerative disc disease in the midthoracic spine with minor spondylitic spurring.   Decadron 8 mg IM and Toradol 60 mg IM in clinic.  Patient tolerated well.  No complications noted.    Take medications as prescribed.    Use of no other NSAIDs while on naproxen; may rotate with Tylenol.    Use of no muscle relaxers while working,  driving, or operating heavy machinery.    Recommend rotating ice and warm moist heat as directed.    Follow-up with PCP in 1-2 days.    Return to clinic as needed.    To ED for any new or acutely worsening symptoms.    Patient in agreement with plan of care.    DISCLAIMER: Please note that my documentation in this Electronic Healthcare Record was produced using speech recognition software and therefore may contain errors related to that software system.These could include grammar, punctuation and spelling errors or the inclusion/exclusion of phrases that were not intended. Garbled syntax, mangled pronouns, and other bizarre constructions may be attributed to that software system.

## 2024-09-30 ENCOUNTER — TELEPHONE (OUTPATIENT)
Dept: PRIMARY CARE CLINIC | Facility: CLINIC | Age: 64
End: 2024-09-30
Payer: COMMERCIAL

## 2024-10-11 ENCOUNTER — TELEPHONE (OUTPATIENT)
Dept: RADIATION ONCOLOGY | Facility: CLINIC | Age: 64
End: 2024-10-11
Payer: COMMERCIAL

## 2024-10-11 DIAGNOSIS — C61 PROSTATE CANCER: ICD-10-CM

## 2024-10-11 DIAGNOSIS — R12 HEARTBURN: ICD-10-CM

## 2024-10-11 DIAGNOSIS — N52.01 ERECTILE DYSFUNCTION DUE TO ARTERIAL INSUFFICIENCY: ICD-10-CM

## 2024-10-11 RX ORDER — OMEPRAZOLE 40 MG/1
40 CAPSULE, DELAYED RELEASE ORAL EVERY MORNING
Qty: 90 CAPSULE | Refills: 0 | Status: SHIPPED | OUTPATIENT
Start: 2024-10-11 | End: 2025-10-06

## 2024-10-11 RX ORDER — TAMSULOSIN HYDROCHLORIDE 0.4 MG/1
1 CAPSULE ORAL DAILY
Qty: 90 CAPSULE | Refills: 3 | Status: SHIPPED | OUTPATIENT
Start: 2024-10-11

## 2024-10-11 NOTE — TELEPHONE ENCOUNTER
Called to discuss refill request, no answer. Left a voicemail requesting a call back. Contact information provided.

## 2024-10-14 RX ORDER — TADALAFIL 20 MG/1
20 TABLET ORAL DAILY PRN
Qty: 30 TABLET | Refills: 11 | Status: SHIPPED | OUTPATIENT
Start: 2024-10-14 | End: 2024-10-16 | Stop reason: ALTCHOICE

## 2024-10-14 RX ORDER — OMEPRAZOLE 40 MG/1
40 CAPSULE, DELAYED RELEASE ORAL EVERY MORNING
Qty: 90 CAPSULE | Refills: 0 | Status: SHIPPED | OUTPATIENT
Start: 2024-10-14 | End: 2025-10-09

## 2024-10-15 ENCOUNTER — PATIENT MESSAGE (OUTPATIENT)
Dept: UROLOGY | Facility: CLINIC | Age: 64
End: 2024-10-15
Payer: COMMERCIAL

## 2024-10-16 RX ORDER — SILDENAFIL 100 MG/1
TABLET, FILM COATED ORAL
Qty: 30 TABLET | Refills: 12 | Status: SHIPPED | OUTPATIENT
Start: 2024-10-16 | End: 2024-10-17 | Stop reason: SDUPTHER

## 2024-10-17 ENCOUNTER — TELEPHONE (OUTPATIENT)
Dept: UROLOGY | Facility: HOSPITAL | Age: 64
End: 2024-10-17
Payer: COMMERCIAL

## 2024-10-17 RX ORDER — SILDENAFIL 100 MG/1
TABLET, FILM COATED ORAL
Qty: 30 TABLET | Refills: 12 | Status: SHIPPED | OUTPATIENT
Start: 2024-10-17

## 2024-10-17 NOTE — TELEPHONE ENCOUNTER
"done      ----- Message from CLAUDIA Cdoy sent at 10/16/2024  3:52 PM CDT -----  Regarding: FW: refill  Contact: [phone]  Please resend viagra to [facility]    Maria Victoria  ----- Message -----  From: Fartun Munoz  Sent: 10/16/2024   1:28 PM CDT  To: Vince Venegas Staff  Subject: refill                                           .Name Of Caller: Self     Contact Preference?:[phone]     What is the nature of the call?: calling in regards to needing pt medicationsildenafiL (VIAGRA) 100 MG tablet  [facility] which [facility]. Pl call       .    [facility]/pharmacy #[id number] - [place] - [address]  [address] [place] [address]  Phone: [phone] Fax: [phone]           Additional Notes:  "Thank you for all that you do for our patients"  "

## 2024-10-28 DIAGNOSIS — R12 HEARTBURN: ICD-10-CM

## 2024-10-28 RX ORDER — OMEPRAZOLE 40 MG/1
40 CAPSULE, DELAYED RELEASE ORAL
Qty: 90 CAPSULE | Refills: 0 | Status: SHIPPED | OUTPATIENT
Start: 2024-10-28

## 2024-11-10 RX ORDER — SILDENAFIL 100 MG/1
TABLET, FILM COATED ORAL
Qty: 30 TABLET | Refills: 12 | Status: SHIPPED | OUTPATIENT
Start: 2024-11-10

## 2024-12-24 ENCOUNTER — OFFICE VISIT (OUTPATIENT)
Dept: PRIMARY CARE CLINIC | Facility: CLINIC | Age: 64
End: 2024-12-24
Payer: COMMERCIAL

## 2024-12-24 VITALS
DIASTOLIC BLOOD PRESSURE: 76 MMHG | BODY MASS INDEX: 28.4 KG/M2 | HEART RATE: 76 BPM | SYSTOLIC BLOOD PRESSURE: 131 MMHG | OXYGEN SATURATION: 98 % | HEIGHT: 65 IN | WEIGHT: 170.44 LBS

## 2024-12-24 DIAGNOSIS — R51.9 CHRONIC NONINTRACTABLE HEADACHE, UNSPECIFIED HEADACHE TYPE: Primary | ICD-10-CM

## 2024-12-24 DIAGNOSIS — G89.29 CHRONIC NONINTRACTABLE HEADACHE, UNSPECIFIED HEADACHE TYPE: Primary | ICD-10-CM

## 2024-12-24 DIAGNOSIS — Z00.00 ANNUAL PHYSICAL EXAM: ICD-10-CM

## 2024-12-24 PROCEDURE — 99214 OFFICE O/P EST MOD 30 MIN: CPT | Mod: S$GLB,,, | Performed by: FAMILY MEDICINE

## 2024-12-24 PROCEDURE — 99999 PR PBB SHADOW E&M-EST. PATIENT-LVL III: CPT | Mod: PBBFAC,,, | Performed by: FAMILY MEDICINE

## 2024-12-24 RX ORDER — NAPROXEN 500 MG/1
500 TABLET ORAL 2 TIMES DAILY PRN
Qty: 30 TABLET | Refills: 2 | Status: SHIPPED | OUTPATIENT
Start: 2024-12-24

## 2024-12-24 RX ORDER — TIZANIDINE 4 MG/1
4 TABLET ORAL EVERY 6 HOURS PRN
Qty: 30 TABLET | Refills: 2 | Status: SHIPPED | OUTPATIENT
Start: 2024-12-24

## 2024-12-24 RX ORDER — TADALAFIL 20 MG/1
20 TABLET ORAL DAILY PRN
COMMUNITY
Start: 2024-12-03

## 2024-12-24 RX ORDER — OMEPRAZOLE 20 MG/1
20 CAPSULE, DELAYED RELEASE ORAL DAILY
COMMUNITY

## 2024-12-24 NOTE — PROGRESS NOTES
Clinic Note  12/24/2024      Subjective:       Patient ID:  Jam is a 64 y.o. male being seen for:      History of Present Illness    CHIEF COMPLAINT:  Jam presents today for follow-up regarding migraines and headaches.    HEADACHES:  He reports daily headaches for approximately 2 months, originating in the occipital region and radiating to the right eye. Excedrin provides temporary relief. He denies nausea, vomiting, or excessive sweating/tearing in the right eye, but endorses occasional photophobia and phonophobia. He currently takes Excedrin for management.  Patient just saw the eye specialist and will be getting new glasses soon, has a history of right eye retina and cataract surgery    SLEEP:  He reports approximately 7 hours of sleep per night with mild to heavy snoring. He denies waking up gasping for air or observed apneic episodes.    SURGICAL HISTORY:  He underwent eye surgery last year for retina repair and cataract removal. He also had cervical spine surgery with anterior and posterior clip placement.    ONCOLOGIC HISTORY:  He was diagnosed with prostate cancer in 2023 after elevated PSA led to biopsy. He underwent radiation seed implant therapy. He was asymptomatic prior to treatment.    PHYSICAL STATUS:  His current weight is 170 lbs.      ROS:  General: -fever, -chills, -fatigue, -weight gain, -weight loss  Eyes: -vision changes, -redness, -discharge  ENT: -ear pain, -nasal congestion, -sore throat  Cardiovascular: -chest pain, -palpitations, -lower extremity edema  Respiratory: -cough, -shortness of breath  Gastrointestinal: -abdominal pain, -nausea, -vomiting, -diarrhea, -constipation, -blood in stool  Genitourinary: -dysuria, -hematuria, -frequency  Musculoskeletal: -joint pain, -muscle pain  Skin: -rash, -lesion  Neurological: +headache, -dizziness, -numbness, -tingling  Psychiatric: -anxiety, -depression, -sleep difficulty  Endocrine: -excessive sweating           Medication List with  "Changes/Refills   New Medications    NAPROXEN (NAPROSYN) 500 MG TABLET    Take 1 tablet (500 mg total) by mouth 2 (two) times daily as needed (headaches / pain (take with food. do no take together with ibuprofen)).    TIZANIDINE (ZANAFLEX) 4 MG TABLET    Take 1 tablet (4 mg total) by mouth every 6 (six) hours as needed (muscle tension / tension headaches).   Current Medications    CETIRIZINE (ZYRTEC) 10 MG TABLET    Take 10 mg by mouth once daily.    OMEPRAZOLE (PRILOSEC) 20 MG CAPSULE    Take 20 mg by mouth once daily.    OMEPRAZOLE (PRILOSEC) 40 MG CAPSULE    Take 1 capsule by mouth once daily    SILDENAFIL (VIAGRA) 100 MG TABLET    Take 1/2 to 1 tablet daily as needed.  Take on an empty stomach or with a light meal.    TADALAFIL (CIALIS) 20 MG TAB    Take 20 mg by mouth daily as needed.   Discontinued Medications    TAMSULOSIN (FLOMAX) 0.4 MG CAP    Take 1 capsule (0.4 mg total) by mouth once daily.       Patient Active Problem List   Diagnosis    Chest pain    Non-cardiac chest pain    Paresthesias in left hand    Elevated PSA    Cervicalgia    Ankle sprain and strain    Right ankle pain    BPH (benign prostatic hyperplasia)    Prostate cancer           Objective:      /76 (BP Location: Left arm, Patient Position: Sitting)   Pulse 76   Ht 5' 5" (1.651 m)   Wt 77.3 kg (170 lb 6.7 oz)   SpO2 98%   BMI 28.36 kg/m²       Physical Exam    Vitals: Weight: 170 lbs.  General: No acute distress. Well-developed. Well-nourished.  Eyes: EOMI. Sclerae anicteric.  HENT: Normocephalic. Atraumatic. Nares patent. Moist oral mucosa.  Cardiovascular: Regular rate. Regular rhythm. No murmurs. No rubs. No gallops. Normal S1, S2.  Respiratory: Normal respiratory effort. Clear to auscultation bilaterally. No rales. No rhonchi. No wheezing.  Musculoskeletal: No  obvious deformity.  Extremities: No lower extremity edema.  Neurological: Alert & oriented x3. No slurred speech. Normal gait.  Psychiatric: Normal mood. Normal " affect. Good insight. Good judgment.  Skin: Warm. Dry. No rash.           Assessment and Plan:       - Assessed patient's recent headaches, likely tension-type, possibly exacerbated by excessive computer use and driving  - Considered potential contributing factors: recent eye surgery, needing new eyeglass prescription, possible sleep apnea  - Evaluated medication overuse as potential cause due to frequent Excedrin use  - Will prescribe naproxen and muscle relaxer (Tizanidine) for symptomatic relief  - Planned comprehensive lab work to rule out other underlying causes  - Noted patient's history of prostate cancer, treated with radiation seed implants in 2023, currently monitored every 6 months    CHRONIC TENSION-TYPE HEADACHE:  - Explained potential risk of medication overuse headache with frequent Excedrin use.  - Jam to use heating pad with massaging function for neck and shoulder area.  - Recommend considering use of headache relief cap (hot or cold) for tension relief.  - Started naproxen (Aleve): Take as needed for headaches.  - Started Tizanidine: Take as needed for muscle tension headaches.  - Break pill in half initially to assess tolerance.  - May cause drowsiness.  - Discontinued Excedrin: Advised to limit use to no more than 1 week per month.  - Informed about the sedating effects of muscle relaxants.  - recommend wearing headache cap      FOLLOW-UP AND LAB WORK:  - Comprehensive lab work ordered: CBC, kidney and liver function tests, cholesterol, diabetes screening, thyroid panel.  - Follow up in March for review of lab results.  - Jam to get blood drawn at any [facility] facility before March appointment.  - Jam to stop at  to associate labs with upcoming visit.         Follow Up:   No follow-ups on file.    Other Orders Placed This Visit:  Orders Placed This Encounter    CBC Auto Differential    Comprehensive Metabolic Panel    Lipid Panel    Hemoglobin A1C    TSH    naproxen  (NAPROSYN) 500 MG tablet    tiZANidine (ZANAFLEX) 4 MG tablet         Andreas Ortega MD        This note is dictated on M*Modal word recognition program and This note was generated with the assistance of ambient listening technology. Verbal consent was obtained by the patient and accompanying visitor(s) for the recording of patient appointment to facilitate this note. I attest to having reviewed and edited the generated note for accuracy, though some syntax or spelling errors may persist. Please contact the author of this note for any clarification.  .  There are word recognition mistakes that are occasionally missed on review.

## 2025-02-22 NOTE — TELEPHONE ENCOUNTER
Care Due:                  Date            Visit Type   Department     Provider  --------------------------------------------------------------------------------                                EP -                              PRIMARY      [facility] PRIMARY  Last Visit: 12-      CARE ([facility])   CARE           TULIO THOMPSON  Next Visit: None Scheduled  None         None Found                                                            Last  Test          Frequency    Reason                     Performed    Due Date  --------------------------------------------------------------------------------    CBC.........  12 months..  naproxen.................  03-   03-    Cr..........  12 months..  naproxen.................  06- 06-    [employer] Embedded Care Due Messages. Reference number: [id number].   2/22/2025 3:49:58 PM CST

## 2025-02-26 RX ORDER — NAPROXEN 500 MG/1
TABLET ORAL
Qty: 30 TABLET | Refills: 2 | Status: SHIPPED | OUTPATIENT
Start: 2025-02-26

## 2025-02-28 ENCOUNTER — OFFICE VISIT (OUTPATIENT)
Dept: URGENT CARE | Facility: CLINIC | Age: 65
End: 2025-02-28
Payer: COMMERCIAL

## 2025-02-28 VITALS
HEART RATE: 67 BPM | HEIGHT: 65 IN | WEIGHT: 162 LBS | RESPIRATION RATE: 19 BRPM | SYSTOLIC BLOOD PRESSURE: 137 MMHG | DIASTOLIC BLOOD PRESSURE: 81 MMHG | OXYGEN SATURATION: 98 % | BODY MASS INDEX: 26.99 KG/M2 | TEMPERATURE: 97 F

## 2025-02-28 DIAGNOSIS — H66.92 LEFT OTITIS MEDIA, UNSPECIFIED OTITIS MEDIA TYPE: Primary | ICD-10-CM

## 2025-02-28 RX ORDER — AMOXICILLIN 875 MG/1
875 TABLET, FILM COATED ORAL 2 TIMES DAILY
Qty: 20 TABLET | Refills: 0 | Status: SHIPPED | OUTPATIENT
Start: 2025-02-28 | End: 2025-03-10

## 2025-02-28 RX ORDER — NEOMYCIN SULFATE, POLYMYXIN B SULFATE AND HYDROCORTISONE 10; 3.5; 1 MG/ML; MG/ML; [USP'U]/ML
3 SUSPENSION/ DROPS AURICULAR (OTIC) 4 TIMES DAILY
Qty: 10 ML | Refills: 0 | Status: SHIPPED | OUTPATIENT
Start: 2025-02-28 | End: 2025-03-07

## 2025-02-28 NOTE — PROGRESS NOTES
"Subjective:      Patient ID: Jam Firedman is a 64 y.o. male.    Vitals:  height is 5' 5" (1.651 m) and weight is 73.5 kg (162 lb). His oral temperature is 97.3 °F (36.3 °C). His blood pressure is 137/81 and his pulse is 67. His respiration is 19 and oxygen saturation is 98%.     Chief Complaint: Otalgia (Bilateral ear pain. L>R x3 days)    64-year-old male with left ear pain for the past week.  Patient's son has been flying and traveling lately and has discomfort in the left ear.  Wears hearing aids.    Otalgia   There is pain in both (L>R) ears. This is a new problem. Episode onset: 3 days. The problem occurs constantly. The problem has been gradually worsening. There has been no fever. The pain is at a severity of 4/10. The pain is moderate. He has tried NSAIDs for the symptoms. The treatment provided mild relief.       HENT:  Positive for ear pain.       Objective:     Physical Exam   Constitutional: He is oriented to person, place, and time. He appears well-developed. He is cooperative.  Non-toxic appearance. He does not appear ill. No distress.   HENT:   Head: Normocephalic and atraumatic.   Ears:   Right Ear: Hearing, tympanic membrane, external ear and ear canal normal.   Left Ear: Hearing and external ear normal.      Comments: Left tympanic membrane is bulging and red and has some erythema of the ear canal as well on the left  Nose: Nose normal. No mucosal edema, rhinorrhea or nasal deformity. No epistaxis. Right sinus exhibits no maxillary sinus tenderness and no frontal sinus tenderness. Left sinus exhibits no maxillary sinus tenderness and no frontal sinus tenderness.   Mouth/Throat: Uvula is midline, oropharynx is clear and moist and mucous membranes are normal. No trismus in the jaw. Normal dentition. No uvula swelling. No oropharyngeal exudate, posterior oropharyngeal edema or posterior oropharyngeal erythema.   Eyes: Conjunctivae and lids are normal. No scleral icterus.   Neck: Trachea normal and " phonation normal. Neck supple. No edema present. No erythema present. No neck rigidity present.   Cardiovascular: Normal rate, regular rhythm, normal heart sounds and normal pulses.   Pulmonary/Chest: Effort normal and breath sounds normal. No respiratory distress. He has no decreased breath sounds. He has no rhonchi.   Abdominal: Normal appearance.   Musculoskeletal: Normal range of motion.         General: No deformity. Normal range of motion.   Neurological: He is alert and oriented to person, place, and time. He exhibits normal muscle tone. Coordination normal.   Skin: Skin is warm, dry, intact, not diaphoretic and not pale.   Psychiatric: His speech is normal and behavior is normal. Judgment and thought content normal.   Nursing note and vitals reviewed.      Assessment:     1. Left otitis media, unspecified otitis media type        Plan:       Left otitis media, unspecified otitis media type    Other orders  -     amoxicillin (AMOXIL) 875 MG tablet; Take 1 tablet (875 mg total) by mouth 2 (two) times daily. for 10 days  Dispense: 20 tablet; Refill: 0  -     neomycin-polymyxin-hydrocortisone (CORTISPORIN) 3.5-10,000-1 mg/mL-unit/mL-% otic suspension; Place 3 drops into the left ear 4 (four) times daily. for 7 days  Dispense: 10 mL; Refill: 0          Medical Decision Making:   Initial Assessment:   64-year-old male with evidence of wall otitis media and slight otitis externa on the left.  Patient also has possibility this could be barotrauma from flying.  Supportive care and symptomatic treatment and antibiotic and recommended antihistamine before flying.  Discharge with return precautions and follow-up

## 2025-03-06 RX ORDER — CETIRIZINE HYDROCHLORIDE 10 MG/1
10 TABLET ORAL DAILY
Qty: 90 TABLET | Refills: 2 | Status: SHIPPED | OUTPATIENT
Start: 2025-03-06

## 2025-03-06 NOTE — TELEPHONE ENCOUNTER
----- Message from Jay sent at 3/3/2025  6:05 PM CST -----  Contact: [phone]    ----- Message -----  From: Jazmín Reyes  Sent: 3/3/2025   3:22 PM CST  To: Shannon Keys Staff    Requesting an RX refill or new RX.Is this a refill or new RX: refillRX name and strength (copy/paste from chart):  cetirizine (ZYRTEC) 10 MG tabletIs this a 30 day or 90 day RX: Pharmacy name and phone # (copy/paste from chart):  [id number] [place] - [address] [place] [address]: [phone] Fax: [phone] call pt and advise

## 2025-03-06 NOTE — TELEPHONE ENCOUNTER
----- Message from Jay sent at 3/5/2025  8:19 PM CST -----  Contact: [phone]    ----- Message -----  From: Jazmín Reyes  Sent: 3/5/2025   9:41 AM CST  To: Shannon Keys Staff    Requesting an RX refill or new RX.Is this a refill or new RX: refillRX name and strength (copy/paste from chart):  cetirizine (ZYRTEC) 10 MG tabletIs this a 30 day or 90 day RX: Pharmacy name and phone # (copy/paste from chart):  [id number] [place] - [address] [place] [address]: [phone] Fax: [phone] call pt and advise

## 2025-03-06 NOTE — TELEPHONE ENCOUNTER
No care due was identified.  [facility] Embedded Care Due Messages. Reference number: [id number].   3/06/2025 8:45:35 AM CST

## 2025-03-14 ENCOUNTER — LAB VISIT (OUTPATIENT)
Dept: LAB | Facility: HOSPITAL | Age: 65
End: 2025-03-14
Attending: RADIOLOGY
Payer: COMMERCIAL

## 2025-03-14 DIAGNOSIS — Z00.00 ANNUAL PHYSICAL EXAM: ICD-10-CM

## 2025-03-14 DIAGNOSIS — C61 PROSTATE CANCER: ICD-10-CM

## 2025-03-14 LAB
ALBUMIN SERPL BCP-MCNC: 3.9 G/DL (ref 3.5–5.2)
ALP SERPL-CCNC: 119 U/L (ref 40–150)
ALT SERPL W/O P-5'-P-CCNC: 14 U/L (ref 10–44)
ANION GAP SERPL CALC-SCNC: 7 MMOL/L (ref 8–16)
AST SERPL-CCNC: 29 U/L (ref 10–40)
BASOPHILS # BLD AUTO: 0.04 K/UL (ref 0–0.2)
BASOPHILS NFR BLD: 0.7 % (ref 0–1.9)
BILIRUB SERPL-MCNC: 0.6 MG/DL (ref 0.1–1)
BUN SERPL-MCNC: 14 MG/DL (ref 8–23)
CALCIUM SERPL-MCNC: 9.7 MG/DL (ref 8.7–10.5)
CHLORIDE SERPL-SCNC: 111 MMOL/L (ref 95–110)
CHOLEST SERPL-MCNC: 178 MG/DL (ref 120–199)
CHOLEST/HDLC SERPL: 4.8 {RATIO} (ref 2–5)
CO2 SERPL-SCNC: 27 MMOL/L (ref 23–29)
COMPLEXED PSA SERPL-MCNC: 3.4 NG/ML (ref 0–4)
CREAT SERPL-MCNC: 1.1 MG/DL (ref 0.5–1.4)
DIFFERENTIAL METHOD BLD: ABNORMAL
EOSINOPHIL # BLD AUTO: 0.1 K/UL (ref 0–0.5)
EOSINOPHIL NFR BLD: 1.2 % (ref 0–8)
ERYTHROCYTE [DISTWIDTH] IN BLOOD BY AUTOMATED COUNT: 11.7 % (ref 11.5–14.5)
EST. GFR  (NO RACE VARIABLE): >60 ML/MIN/1.73 M^2
ESTIMATED AVG GLUCOSE: 82 MG/DL (ref 68–131)
GLUCOSE SERPL-MCNC: 90 MG/DL (ref 70–110)
HBA1C MFR BLD: 4.5 % (ref 4–5.6)
HCT VFR BLD AUTO: 45.7 % (ref 40–54)
HDLC SERPL-MCNC: 37 MG/DL (ref 40–75)
HDLC SERPL: 20.8 % (ref 20–50)
HGB BLD-MCNC: 15.6 G/DL (ref 14–18)
IMM GRANULOCYTES # BLD AUTO: 0.02 K/UL (ref 0–0.04)
IMM GRANULOCYTES NFR BLD AUTO: 0.3 % (ref 0–0.5)
LDLC SERPL CALC-MCNC: 115.8 MG/DL (ref 63–159)
LYMPHOCYTES # BLD AUTO: 1.3 K/UL (ref 1–4.8)
LYMPHOCYTES NFR BLD: 21.4 % (ref 18–48)
MCH RBC QN AUTO: 33 PG (ref 27–31)
MCHC RBC AUTO-ENTMCNC: 34.1 G/DL (ref 32–36)
MCV RBC AUTO: 97 FL (ref 82–98)
MONOCYTES # BLD AUTO: 0.6 K/UL (ref 0.3–1)
MONOCYTES NFR BLD: 9.7 % (ref 4–15)
NEUTROPHILS # BLD AUTO: 3.9 K/UL (ref 1.8–7.7)
NEUTROPHILS NFR BLD: 66.7 % (ref 38–73)
NONHDLC SERPL-MCNC: 141 MG/DL
NRBC BLD-RTO: 0 /100 WBC
PLATELET # BLD AUTO: 233 K/UL (ref 150–450)
PMV BLD AUTO: 11.5 FL (ref 9.2–12.9)
POTASSIUM SERPL-SCNC: 4.9 MMOL/L (ref 3.5–5.1)
PROT SERPL-MCNC: 7.4 G/DL (ref 6–8.4)
RBC # BLD AUTO: 4.73 M/UL (ref 4.6–6.2)
SODIUM SERPL-SCNC: 145 MMOL/L (ref 136–145)
TRIGL SERPL-MCNC: 126 MG/DL (ref 30–150)
TSH SERPL DL<=0.005 MIU/L-ACNC: 1.11 UIU/ML (ref 0.4–4)
WBC # BLD AUTO: 5.9 K/UL (ref 3.9–12.7)

## 2025-03-14 PROCEDURE — 84443 ASSAY THYROID STIM HORMONE: CPT | Performed by: FAMILY MEDICINE

## 2025-03-14 PROCEDURE — 80061 LIPID PANEL: CPT | Performed by: FAMILY MEDICINE

## 2025-03-14 PROCEDURE — 80053 COMPREHEN METABOLIC PANEL: CPT | Performed by: FAMILY MEDICINE

## 2025-03-14 PROCEDURE — 84153 ASSAY OF PSA TOTAL: CPT | Performed by: RADIOLOGY

## 2025-03-14 PROCEDURE — 83036 HEMOGLOBIN GLYCOSYLATED A1C: CPT | Performed by: FAMILY MEDICINE

## 2025-03-14 PROCEDURE — 85025 COMPLETE CBC W/AUTO DIFF WBC: CPT | Performed by: FAMILY MEDICINE

## 2025-03-17 ENCOUNTER — RESULTS FOLLOW-UP (OUTPATIENT)
Dept: PRIMARY CARE CLINIC | Facility: CLINIC | Age: 65
End: 2025-03-17

## 2025-03-17 ENCOUNTER — OFFICE VISIT (OUTPATIENT)
Dept: PRIMARY CARE CLINIC | Facility: CLINIC | Age: 65
End: 2025-03-17
Payer: COMMERCIAL

## 2025-03-17 VITALS
SYSTOLIC BLOOD PRESSURE: 140 MMHG | RESPIRATION RATE: 16 BRPM | DIASTOLIC BLOOD PRESSURE: 82 MMHG | OXYGEN SATURATION: 100 % | BODY MASS INDEX: 29.53 KG/M2 | HEART RATE: 78 BPM | HEIGHT: 65 IN | WEIGHT: 177.25 LBS

## 2025-03-17 DIAGNOSIS — R03.0 ELEVATED BLOOD PRESSURE READING WITHOUT DIAGNOSIS OF HYPERTENSION: ICD-10-CM

## 2025-03-17 DIAGNOSIS — H93.13 TINNITUS OF BOTH EARS: ICD-10-CM

## 2025-03-17 DIAGNOSIS — Z71.2 ENCOUNTER TO DISCUSS TEST RESULTS: ICD-10-CM

## 2025-03-17 DIAGNOSIS — H92.03 OTALGIA OF BOTH EARS: Primary | ICD-10-CM

## 2025-03-17 PROCEDURE — 99999 PR PBB SHADOW E&M-EST. PATIENT-LVL IV: CPT | Mod: PBBFAC,,,

## 2025-03-17 PROCEDURE — 99214 OFFICE O/P EST MOD 30 MIN: CPT | Mod: S$GLB,,,

## 2025-03-17 RX ORDER — TAMSULOSIN HYDROCHLORIDE 0.4 MG/1
1 CAPSULE ORAL
COMMUNITY
Start: 2025-01-09

## 2025-03-17 NOTE — PROGRESS NOTES
Subjective     Patient ID: Jam Friedman is a 64 y.o. male.    Chief Complaint: Otalgia      Jam Friedman is a 64 year old male, presents to clinic for examination of ears and to discuss labs.  New to me.  PCP is Dr. Ortega.  Medical and surgical history, in addition to problem list reviewed and listed below.    States pain in ears started when started flying; had a lot of turbulence and climate control/ with difference.  Chewing gum did not helped.  Went to the urgent care; received ear drops and amoxicillin.  States completed amoxicillin day before yesterday.  Now ears not really a pain but giving a ringing noise and sound like water in them.  Taking cetirizine daily.    Blood pressure elevation - States have occurrences when go to the doctor; otherwise is normal.    Labs reviewed; discussed with patient. Patient was given the opportunity to ask questions.  Patient voiced understanding of results and plan.    Otalgia   There is pain in both ears. This is a recurrent problem. The current episode started more than 1 month ago (Started i n January). The problem occurs constantly (hearing a hollow sound like it's clogged up). The problem has been waxing and waning. There has been no fever. The pain is mild (States not getting really bad now, but when flying or tplane taking off will bother more). Associated symptoms include hearing loss (wear hearing aides) and rhinorrhea (was running but Claritin helped dried out). Pertinent negatives include no abdominal pain, coughing, diarrhea (stopped since started antibiotics), ear discharge, headaches, neck pain, rash, sore throat or vomiting. He has tried antibiotics and ear drops (Amoxicillin, cetirizine and ear drops) for the symptoms. The treatment provided mild relief. His past medical history is significant for hearing loss. There is no history of a chronic ear infection or a tympanostomy tube.     Review of Systems   Constitutional:  Negative for chills, diaphoresis,  fatigue and fever.   HENT:  Positive for ear pain, hearing loss (wear hearing aides), rhinorrhea (was running but Claritin helped dried out) and tinnitus. Negative for nasal congestion, ear discharge, postnasal drip and sore throat.    Eyes:  Negative for pain.   Respiratory:  Negative for cough, chest tightness and shortness of breath.    Cardiovascular:  Negative for chest pain and palpitations.   Gastrointestinal:  Negative for abdominal pain, diarrhea (stopped since started antibiotics) and vomiting.   Genitourinary:  Negative for difficulty urinating.   Musculoskeletal:  Negative for neck pain.   Integumentary:  Negative for rash.   Neurological:  Negative for headaches.     Past Medical History:   Diagnosis Date    Spondylosis of cervical joint      Past Surgical History:   Procedure Laterality Date    CERVICAL FUSION  2005    Anterior and posterior    ESOPHAGOGASTRODUODENOSCOPY N/A 3/28/2023    Procedure: EGD (ESOPHAGOGASTRODUODENOSCOPY);  Surgeon: Elaina Giron MD;  Location: [facility] ENDOSCOPY;  Service: Gastroenterology;  Laterality: N/A;  instr via portal  3/24 pre call complete, pt stated he would have a ride -    KNEE ARTHROSCOPY      RADIOACTIVE SEED IMPLANTATION OF PROSTATE N/A 8/31/2023    Procedure: INSERTION, RADIOACTIVE SEED, PROSTATE;  Surgeon: Theo Clarke MD;  Location: [facility] OR [address] [place] [address];  Service: Urology;  Laterality: N/A;    TRANSRECTAL ULTRASOUND EXAMINATION N/A 8/31/2023    Procedure: ULTRASOUND, RECTAL APPROACH;  Surgeon: Theo Clarke MD;  Location: [facility] OR [address] [place] [address];  Service: Urology;  Laterality: N/A;     Social History[1]  Review of patient's allergies indicates:   Allergen Reactions    Benadryl [diphenhydramine hcl] Other (See Comments)     Hyperactivity    Milk containing products (dairy)      Problem List[2]     Objective   Vitals:    03/17/25 1038 03/17/25 1149   BP: (!) 151/90 (!) 140/82  Comment: Manual   BP Location: Left arm Right arm   Patient Position: Sitting  "Sitting   Pulse: 78    Resp: 16    SpO2: 100%    Weight: 80.4 kg (177 lb 4 oz)    Height: 5' 5" (1.651 m)        Physical Exam  Constitutional:       General: He is not in acute distress.     Appearance: Normal appearance.   HENT:      Head: Normocephalic and atraumatic.      Right Ear: Ear canal and external ear normal. Decreased hearing noted. No drainage. There is no impacted cerumen. Tympanic membrane is erythematous (mild).      Left Ear: Tympanic membrane, ear canal and external ear normal. Decreased hearing noted.      Nose: Mucosal edema present. No rhinorrhea.      Right Sinus: No maxillary sinus tenderness or frontal sinus tenderness.      Left Sinus: No maxillary sinus tenderness or frontal sinus tenderness.      Mouth/Throat:      Mouth: Mucous membranes are moist.      Pharynx: Oropharynx is clear. No oropharyngeal exudate or posterior oropharyngeal erythema.   Eyes:      Extraocular Movements: Extraocular movements intact.      Conjunctiva/sclera: Conjunctivae normal.      Pupils: Pupils are equal, round, and reactive to light.   Cardiovascular:      Rate and Rhythm: Normal rate and regular rhythm.      Pulses: Normal pulses.      Heart sounds: Normal heart sounds.   Pulmonary:      Effort: Pulmonary effort is normal. No respiratory distress.      Breath sounds: Normal breath sounds.   Abdominal:      General: Bowel sounds are normal.      Palpations: Abdomen is soft.   Musculoskeletal:         General: Normal range of motion.      Cervical back: Normal range of motion and neck supple.      Right lower leg: No edema.      Left lower leg: No edema.   Skin:     General: Skin is warm and dry.      Capillary Refill: Capillary refill takes less than 2 seconds.      Findings: No rash.   Neurological:      Mental Status: He is alert and oriented to person, place, and time.   Psychiatric:         Mood and Affect: Mood normal.         Behavior: Behavior normal.            Assessment and Plan     1. Otalgia of " both ears         Ear hygiene: Use a washcloth to wipe and clean the outside of your ears. Avoid use of Q-tips, hairpins, etc., into ears. Keep your ears dry; wear ear plugs as needed to prevent water from entering. Use over the counter ear product like Debrox to get rid of ear wax.  Protect your ears from loud sounds     2. Tinnitus of both ears           Continue cetirizine.    3. Encounter to discuss test results    4. Elevated blood pressure reading without diagnosis of hypertension           Lifestyle changes to reduce blood pressure: Eat balanced meals, reduce salt and caffeine intake, lose weight, and exercise regularly.             Medications Ordered Prior to Encounter[3]         Follow up if symptoms worsen or fail to improve.    Lori A. Stephens Sandifer, GENA-C         [1]   Social History  Socioeconomic History    Marital status: Single    Number of children: 3   Occupational History    Occupation: [employer]     Employer: [employer]   Tobacco Use    Smoking status: Never    Smokeless tobacco: Never   Substance and Sexual Activity    Alcohol use: No     Alcohol/week: 0.0 standard drinks of alcohol     Comment: Rare use    Drug use: No    Sexual activity: Yes     Partners: Female     Social Drivers of Health     Financial Resource Strain: Medium Risk (3/17/2025)    Overall Financial Resource Strain (CARDIA)     Difficulty of Paying Living Expenses: Somewhat hard   Food Insecurity: Food Insecurity Present (3/17/2025)    Hunger Vital Sign     Worried About Running Out of Food in the Last Year: Sometimes true     Ran Out of Food in the Last Year: Sometimes true   Transportation Needs: No Transportation Needs (3/17/2025)    PRAPARE - Transportation     Lack of Transportation (Medical): No     Lack of Transportation (Non-Medical): No   Physical Activity: Sufficiently Active (3/17/2025)    Exercise Vital Sign     Days of Exercise per Week: 5 days     Minutes of Exercise per Session: 30 min    Stress: Stress Concern Present (3/17/2025)    [employer] [employer] [place] [employer] - Occupational Stress Questionnaire     Feeling of Stress : To some extent   Housing Stability: High Risk (3/17/2025)    Housing Stability Vital Sign     Unable to Pay for Housing in the Last Year: Yes     Homeless in the Last Year: No   [2]   Patient Active Problem List  Diagnosis    Chest pain    Non-cardiac chest pain    Paresthesias in left hand    Elevated PSA    Cervicalgia    Ankle sprain and strain    Right ankle pain    BPH (benign prostatic hyperplasia)    Prostate cancer   [3]   Current Outpatient Medications on File Prior to Visit   Medication Sig Dispense Refill    cetirizine (ZYRTEC) 10 MG tablet Take 1 tablet (10 mg total) by mouth once daily. 90 tablet 2    naproxen (NAPROSYN) 500 MG tablet TAKE 1 TABLET 2 TIMES DAILY AS NEEDED (HEADACHES/PAIN WITH FOOD. DO NOT TAKE WITH IBUPROFEN 30 tablet 2    omeprazole (PRILOSEC) 40 MG capsule Take 1 capsule by mouth once daily 90 capsule 0    sildenafiL (VIAGRA) 100 MG tablet Take 1/2 to 1 tablet daily as needed.  Take on an empty stomach or with a light meal. 30 tablet 12    tamsulosin (FLOMAX) 0.4 mg Cap Take 1 capsule by mouth.      tiZANidine (ZANAFLEX) 4 MG tablet Take 1 tablet (4 mg total) by mouth every 6 (six) hours as needed (muscle tension / tension headaches). 30 tablet 2     No current facility-administered medications on file prior to visit.

## 2025-03-18 ENCOUNTER — OFFICE VISIT (OUTPATIENT)
Dept: RADIATION ONCOLOGY | Facility: CLINIC | Age: 65
End: 2025-03-18
Payer: COMMERCIAL

## 2025-03-18 VITALS
HEART RATE: 81 BPM | DIASTOLIC BLOOD PRESSURE: 91 MMHG | TEMPERATURE: 98 F | BODY MASS INDEX: 30.19 KG/M2 | SYSTOLIC BLOOD PRESSURE: 157 MMHG | OXYGEN SATURATION: 97 % | HEIGHT: 65 IN | WEIGHT: 181.19 LBS

## 2025-03-18 DIAGNOSIS — C61 PROSTATE CANCER: Primary | ICD-10-CM

## 2025-03-18 PROCEDURE — 99212 OFFICE O/P EST SF 10 MIN: CPT | Mod: S$GLB,,, | Performed by: RADIOLOGY

## 2025-03-18 PROCEDURE — 99999 PR PBB SHADOW E&M-EST. PATIENT-LVL III: CPT | Mod: PBBFAC,,, | Performed by: RADIOLOGY

## 2025-03-18 RX ORDER — CIPROFLOXACIN 500 MG/1
500 TABLET ORAL 2 TIMES DAILY
Qty: 28 TABLET | Refills: 0 | Status: SHIPPED | OUTPATIENT
Start: 2025-03-18 | End: 2025-04-01

## 2025-03-18 NOTE — PROGRESS NOTES
[facility] - Radiation Oncology     Patient ID: Jam Friedman is a 64 y.o. male.    Chief Complaint: No chief complaint on file.      Mr. Firedman was recently diagnosed with Stage IIB (T1c, N0, M0, GG2, PSA 10-20) prostate cancer.  He was recently referred after PSA on 3/7/23 returned at 17.9 ng/ml.  MRI revealed a 33 cc prostate with a 1.9 cm T2 hypointense lesion in the Lt. transitional zone, PI-RADS 4.  The lesion abutted the capsule without extraprostatic extension.  Biopsies revealed Sergio 7 (3+4) adenocarcinoma involving 30% of 4 of 4 cores from the target area.  The Sergio pattern 4 accounted for < 5% of the tumor.  There was [place] 6 (3+3) adenocarcinoma involving 10 - 30% of 5 of 6 cores from the Lt. apex, Lt. base and Rt. mid gland.  There was no perineural invasion.  The remaining 6 cores were negative.  Polaris molecular score indicated single modality treatment.  The patient completed 44 Gy to the prostate and seminal vesicles followed by a boost to the prostate using Iodine 125 seeds on 8/31/23.  Today the patient states he feels well.  Has recently noted some mild dysuria.  No hematuria.       Review of Systems   Constitutional:  Negative for activity change, appetite change, chills and fatigue.   Gastrointestinal:  Negative for change in bowel habit, constipation, diarrhea and fecal incontinence.   Genitourinary:  Positive for dysuria. Negative for bladder incontinence, difficulty urinating, enuresis, frequency and hematuria.     Physical Exam  Constitutional:       General: He is not in acute distress.     Appearance: Normal appearance.   Neurological:      Mental Status: He is alert and oriented to person, place, and time.   Psychiatric:         Mood and Affect: Mood normal.         Judgment: Judgment normal.        Latest Reference Range & Units 01/03/24 08:01 09/09/24 10:32 03/14/25 10:20   PSA Diagnostic 0.00 - 4.00 ng/mL 2.4 1.8 3.4        Assessment and Plan     Prostate cancer - mild increase in the PSA  discussed results.  Plan to treat with antibiotics.  Plan follow up PSA in 3 months.

## 2025-04-12 DIAGNOSIS — R12 HEARTBURN: ICD-10-CM

## 2025-04-13 DIAGNOSIS — R12 HEARTBURN: ICD-10-CM

## 2025-04-14 RX ORDER — OMEPRAZOLE 40 MG/1
40 CAPSULE, DELAYED RELEASE ORAL EVERY MORNING
Qty: 90 CAPSULE | Refills: 0 | Status: SHIPPED | OUTPATIENT
Start: 2025-04-14

## 2025-06-18 ENCOUNTER — LAB VISIT (OUTPATIENT)
Dept: LAB | Facility: HOSPITAL | Age: 65
End: 2025-06-18
Attending: RADIOLOGY
Payer: COMMERCIAL

## 2025-06-18 ENCOUNTER — RESULTS FOLLOW-UP (OUTPATIENT)
Dept: RADIATION ONCOLOGY | Facility: CLINIC | Age: 65
End: 2025-06-18

## 2025-06-18 DIAGNOSIS — C61 PROSTATE CANCER: ICD-10-CM

## 2025-06-18 DIAGNOSIS — C61 PROSTATE CANCER: Primary | ICD-10-CM

## 2025-06-18 LAB — PSA SERPL-MCNC: 2.17 NG/ML

## 2025-06-18 PROCEDURE — 84153 ASSAY OF PSA TOTAL: CPT

## 2025-06-18 PROCEDURE — 36415 COLL VENOUS BLD VENIPUNCTURE: CPT

## 2025-06-18 RX ORDER — NAPROXEN 500 MG/1
TABLET ORAL
Qty: 30 TABLET | Refills: 2 | Status: SHIPPED | OUTPATIENT
Start: 2025-06-18

## 2025-06-18 NOTE — TELEPHONE ENCOUNTER
No care due was identified.  [facility] Embedded Care Due Messages. Reference number: [id number].   6/18/2025 1:26:12 PM CDT

## 2025-07-10 DIAGNOSIS — R12 HEARTBURN: ICD-10-CM

## 2025-07-10 RX ORDER — OMEPRAZOLE 40 MG/1
40 CAPSULE, DELAYED RELEASE ORAL EVERY MORNING
Qty: 90 CAPSULE | Refills: 0 | Status: SHIPPED | OUTPATIENT
Start: 2025-07-10

## 2025-08-07 ENCOUNTER — OFFICE VISIT (OUTPATIENT)
Dept: PRIMARY CARE CLINIC | Facility: CLINIC | Age: 65
End: 2025-08-07

## 2025-08-07 VITALS
DIASTOLIC BLOOD PRESSURE: 99 MMHG | TEMPERATURE: 98 F | HEART RATE: 84 BPM | SYSTOLIC BLOOD PRESSURE: 155 MMHG | HEIGHT: 65 IN | OXYGEN SATURATION: 96 % | WEIGHT: 186.31 LBS | BODY MASS INDEX: 31.04 KG/M2

## 2025-08-07 DIAGNOSIS — H61.22 LEFT EAR IMPACTED CERUMEN: ICD-10-CM

## 2025-08-07 DIAGNOSIS — R03.0 ELEVATED BLOOD PRESSURE READING WITHOUT DIAGNOSIS OF HYPERTENSION: ICD-10-CM

## 2025-08-07 DIAGNOSIS — R30.0 DYSURIA: Primary | ICD-10-CM

## 2025-08-07 DIAGNOSIS — H93.8X3 SENSATION OF FULLNESS IN BOTH EARS: ICD-10-CM

## 2025-08-07 PROCEDURE — 99213 OFFICE O/P EST LOW 20 MIN: CPT | Mod: ,,,

## 2025-08-07 PROCEDURE — 99999 PR PBB SHADOW E&M-EST. PATIENT-LVL V: CPT | Mod: PBBFAC,,,

## 2025-08-07 PROCEDURE — 81003 URINALYSIS AUTO W/O SCOPE: CPT | Mod: QW,,,

## 2025-08-07 PROCEDURE — 87086 URINE CULTURE/COLONY COUNT: CPT

## 2025-08-08 ENCOUNTER — TELEPHONE (OUTPATIENT)
Dept: PRIMARY CARE CLINIC | Facility: CLINIC | Age: 65
End: 2025-08-08
Payer: COMMERCIAL

## 2025-08-08 ENCOUNTER — PATIENT MESSAGE (OUTPATIENT)
Dept: PRIMARY CARE CLINIC | Facility: CLINIC | Age: 65
End: 2025-08-08

## 2025-08-08 LAB
BACTERIA UR CULT: NO GROWTH
BILIRUB SERPL-MCNC: NEGATIVE MG/DL
BLOOD, POC UA: ABNORMAL
GLUCOSE UR QL STRIP: NEGATIVE
KETONES UR QL STRIP: NEGATIVE
LEUKOCYTE ESTERASE URINE, POC: NEGATIVE
NITRITE, POC UA: NEGATIVE
PH, POC UA: 6
PROTEIN, POC: NEGATIVE
SPECIFIC GRAVITY, POC UA: <=1.005
UROBILINOGEN, POC UA: ABNORMAL

## 2025-08-08 NOTE — TELEPHONE ENCOUNTER
Copied from CRM #[id number]. Topic: General Inquiry - Patient Advice  >> Aug 8, 2025  2:09 PM Roxy wrote:  Type:  Needs Medical Advice    Who Called: Patient  Symptoms (please be specific): Blood Pressure  How long has patient had these symptoms:  ongoing  Pharmacy name and phone #:   [id number] - [place] - [address]  [address] [place] [address]  Phone: [phone] Fax: [phone]  Would the patient rather a call back or a response via [facility]? Call back  Best Call Back Number: [phone]  Additional Information: Patient has been instructed to take his Blood Pressure reading however, patient does not have a Blood pressure monitor/Machine .. Please send in a prescription so that he can start monitoring his blood pressure.    Please call and advise.    Thank You

## 2025-08-21 ENCOUNTER — TELEPHONE (OUTPATIENT)
Dept: PRIMARY CARE CLINIC | Facility: CLINIC | Age: 65
End: 2025-08-21
Payer: COMMERCIAL

## 2025-08-25 ENCOUNTER — TELEPHONE (OUTPATIENT)
Dept: PRIMARY CARE CLINIC | Facility: CLINIC | Age: 65
End: 2025-08-25
Payer: COMMERCIAL

## (undated) DEVICE — TRAY CYSTO BASIN OMC

## (undated) DEVICE — SYR 10CC LUER LOCK

## (undated) DEVICE — PACK CYSTOSCOPY III SIRUS

## (undated) DEVICE — COVER TRANSDUCER LATEX N/STERI

## (undated) DEVICE — BLLN ENDOCAVITY 2X14CM

## (undated) DEVICE — Device

## (undated) DEVICE — SYR 50CC LL

## (undated) DEVICE — UNDERGLOVES BIOGEL PI SZ 7 LF

## (undated) DEVICE — SYR 50ML CATH TIP

## (undated) DEVICE — BLADE CLIPPER SENICLIP SURGICA

## (undated) DEVICE — UNDERGLOVES BIOGEL PI SIZE 7.5

## (undated) DEVICE — SOL WATER STRL IRR 1000ML

## (undated) DEVICE — PLUG CATHETER STERILE FOLEY

## (undated) DEVICE — UNDERGLOVES BIOGEL PI SIZE 8